# Patient Record
Sex: FEMALE | Race: BLACK OR AFRICAN AMERICAN | NOT HISPANIC OR LATINO | Employment: OTHER
[De-identification: names, ages, dates, MRNs, and addresses within clinical notes are randomized per-mention and may not be internally consistent; named-entity substitution may affect disease eponyms.]

---

## 2017-07-29 ENCOUNTER — HEALTH MAINTENANCE LETTER (OUTPATIENT)
Age: 22
End: 2017-07-29

## 2018-10-25 ENCOUNTER — TRANSFERRED RECORDS (OUTPATIENT)
Dept: MULTI SPECIALTY CLINIC | Facility: CLINIC | Age: 23
End: 2018-10-25

## 2018-10-25 LAB
C TRACH DNA SPEC QL PROBE+SIG AMP: NEGATIVE
N GONORRHOEA DNA SPEC QL PROBE+SIG AMP: NEGATIVE
PAP-ABSTRACT: NORMAL
SPECIMEN DESCRIP: NORMAL
SPECIMEN DESCRIPTION: NORMAL

## 2018-12-22 ENCOUNTER — HOSPITAL ENCOUNTER (EMERGENCY)
Facility: CLINIC | Age: 23
End: 2018-12-22

## 2018-12-22 ENCOUNTER — HOSPITAL ENCOUNTER (OUTPATIENT)
Facility: CLINIC | Age: 23
Discharge: HOME | End: 2018-12-22
Attending: SPECIALIST | Admitting: SPECIALIST
Payer: MEDICAID

## 2018-12-22 VITALS — SYSTOLIC BLOOD PRESSURE: 115 MMHG | TEMPERATURE: 98.8 F | DIASTOLIC BLOOD PRESSURE: 72 MMHG | RESPIRATION RATE: 16 BRPM

## 2018-12-22 PROBLEM — Z36.89 ENCOUNTER FOR TRIAGE IN PREGNANT PATIENT: Status: ACTIVE | Noted: 2018-12-22

## 2018-12-22 LAB
ALBUMIN UR-MCNC: 30 MG/DL
APPEARANCE UR: CLEAR
BILIRUB UR QL STRIP: NEGATIVE
COLOR UR AUTO: YELLOW
GLUCOSE UR STRIP-MCNC: 30 MG/DL
HGB UR QL STRIP: NEGATIVE
KETONES UR STRIP-MCNC: 5 MG/DL
LEUKOCYTE ESTERASE UR QL STRIP: NEGATIVE
MUCOUS THREADS #/AREA URNS LPF: PRESENT /LPF
NITRATE UR QL: NEGATIVE
PH UR STRIP: 6.5 PH (ref 5–7)
RBC #/AREA URNS AUTO: 0 /HPF (ref 0–2)
SOURCE: ABNORMAL
SP GR UR STRIP: 1.03 (ref 1–1.03)
SQUAMOUS #/AREA URNS AUTO: 1 /HPF (ref 0–1)
UROBILINOGEN UR STRIP-MCNC: 2 MG/DL (ref 0–2)
WBC #/AREA URNS AUTO: 0 /HPF (ref 0–5)

## 2018-12-22 PROCEDURE — 81001 URINALYSIS AUTO W/SCOPE: CPT | Performed by: SPECIALIST

## 2018-12-22 PROCEDURE — G0463 HOSPITAL OUTPT CLINIC VISIT: HCPCS

## 2018-12-22 PROCEDURE — 25000132 ZZH RX MED GY IP 250 OP 250 PS 637

## 2018-12-22 RX ORDER — ACETAMINOPHEN 325 MG/1
TABLET ORAL
Status: COMPLETED
Start: 2018-12-22 | End: 2018-12-22

## 2018-12-22 RX ORDER — ACETAMINOPHEN 325 MG/1
650 TABLET ORAL ONCE
Status: COMPLETED | OUTPATIENT
Start: 2018-12-22 | End: 2018-12-22

## 2018-12-22 RX ADMIN — ACETAMINOPHEN 650 MG: 325 TABLET ORAL at 02:54

## 2018-12-22 RX ADMIN — ACETAMINOPHEN 650 MG: 325 TABLET, FILM COATED ORAL at 02:54

## 2018-12-22 NOTE — PROVIDER NOTIFICATION
12/22/18 0240   Provider Notification   Provider Name/Title Dr. BAIRON Day   Method of Notification Phone   Request Evaluate - Remote   Notification Reason Patient Arrived;Pain;Lab/Diagnostic Study;Status Update   Dr. BAIRON Day advised of patient arrival, complaint of lower abdominal pain for past couple days, stating not painful now. FHR at 140. Urinalysis showed protein and glucose in urine, but no WBC's pr RBC's. Advised patient has not seen OB since U/S in [place] in November. Dr. Day feels pain probably due to round ligament discomfort. Okay to give Tylenol 2 tablets. Have patient make an appointment with an OB, give clinic numbers to a couple OB groups if patient desires. Discharge patient home.

## 2018-12-22 NOTE — PLAN OF CARE
0100- patient arrived ambulatory. She was sent from ER since she is just over 16 weeks.  Patient is here with complaints of abdominal cramping lower in abdomen on both sides. The cramping started Thursday around 11:00 a.m. Cramping comes and goes first one side and then the other. Pain subsided when laid down. Also complains of a whitsh discharge, no oder or vaginal itching, also denies any urinary symptoms. Also has a heavy feeling in upper abdomen but points to stomach area. Patient had gone to an urgent care in [place], [place] with nausea and found out she was pregnant, she saw an OB Doctor then had U/S on November 12, 2018 that placed her at 8 weeks then. That gave her a due date of June 6, 2019. She has not been seen since. She recently moved to [place] from [place] and has not found an OB/GYN MD here yet. Water given hydrate. So urine can be sent.

## 2018-12-22 NOTE — DISCHARGE INSTRUCTIONS
Discharge Instruction for Undelivered Patients      You were seen for: abdominal pain  We Consulted: Dr. Day  You had (Test or Medicine):doptone fetal heart rate, urinalysis     Diet:   Drink 8 to 12 glasses of liquids (milk, juice, water) every day.  You may eat meals and snacks.     Activity:       Call your provider if you notice:  Swelling in your face or increased swelling in your hands or legs.  Headaches that are not relieved by Tylenol (acetaminophen).  Changes in your vision (blurring: seeing spots or stars.)  Nausea (sick to your stomach) and vomiting (throwing up).   Weight gain of 5 pounds or more per week.  Heartburn that doesn't go away.  Signs of bladder infection: pain when you urinate (use the toilet), need to go more often and more urgently.  The bag of phan (rupture of membranes) breaks, or you notice leaking in your underwear.  Bright red blood in your underwear.  Abdominal (lower belly) or stomach pain.  For first baby: Contractions (tightening) less than 5 minutes apart for one hour or more.  Second (plus) baby: Contractions (tightening) less than 10 minutes apart and getting stronger.  *If less than 34 weeks: Contractions (tightenings) more than 6 times in one hour.  Increase or change in vaginal discharge (note the color and amount)  Other: make an appointment to be seen with an OB doctor.    Follow-up:  Find OB and make an appointment to be seen.

## 2018-12-22 NOTE — PLAN OF CARE
0200- Patient finally able to void, clean catch urine to lab. Patient denies  pain at this time. States was in [place] yesterday when abdominal discomfort first started. Was able to sleep most of Thursday  Night. Pain started again around 11:00 a.m. Friday. Was uncomfortable with it on the drive back form [place] this past evening. States the pain doesn't last very long. Unable to rate it. Denies any discomfort now. Just wanted to make sure everything was okay.

## 2018-12-22 NOTE — PLAN OF CARE
PO Tylenol given. Patient encourage to start Prenatal vitamins, find OB MD and make an appointment as soon as possible. Patient declined office numbers to any clinic groups at this time. Home ambulatory.

## 2019-01-07 LAB
ABO + RH BLD: NORMAL
ABO + RH BLD: NORMAL
HBV SURFACE AG SERPL QL IA: NONREACTIVE
HBV SURFACE AG SERPL QL IA: NORMAL
HCT VFR BLD AUTO: 37.4 %
HEMOGLOBIN: 13 G/DL (ref 11.7–15.7)
HIV 1+2 AB+HIV1 P24 AG SERPL QL IA: NONREACTIVE
HIV 1+2 AB+HIV1 P24 AG SERPL QL IA: NORMAL
PLATELET # BLD AUTO: 280 10^9/L
RUBELLA ABY IGG: NORMAL
RUBELLA ABY IGG: NORMAL
TREPONEMA ANTIBODIES: NONREACTIVE

## 2019-02-12 ENCOUNTER — TRANSFERRED RECORDS (OUTPATIENT)
Dept: HEALTH INFORMATION MANAGEMENT | Facility: CLINIC | Age: 24
End: 2019-02-12

## 2019-02-13 ENCOUNTER — TRANSFERRED RECORDS (OUTPATIENT)
Dept: HEALTH INFORMATION MANAGEMENT | Facility: CLINIC | Age: 24
End: 2019-02-13

## 2019-03-04 PROBLEM — Z23 NEED FOR TDAP VACCINATION: Status: ACTIVE | Noted: 2019-03-04

## 2019-03-11 ENCOUNTER — PRENATAL OFFICE VISIT (OUTPATIENT)
Dept: MIDWIFE SERVICES | Facility: CLINIC | Age: 24
End: 2019-03-11
Payer: COMMERCIAL

## 2019-03-11 VITALS — BODY MASS INDEX: 31.35 KG/M2 | SYSTOLIC BLOOD PRESSURE: 128 MMHG | WEIGHT: 170 LBS | DIASTOLIC BLOOD PRESSURE: 76 MMHG

## 2019-03-11 DIAGNOSIS — Z34.02 ENCOUNTER FOR SUPERVISION OF NORMAL FIRST PREGNANCY IN SECOND TRIMESTER: Primary | ICD-10-CM

## 2019-03-11 DIAGNOSIS — Z23 NEED FOR PROPHYLACTIC VACCINATION AND INOCULATION AGAINST INFLUENZA: ICD-10-CM

## 2019-03-11 DIAGNOSIS — Z23 NEED FOR TDAP VACCINATION: ICD-10-CM

## 2019-03-11 DIAGNOSIS — R21 RASH: ICD-10-CM

## 2019-03-11 LAB
GLUCOSE 1H P 50 G GLC PO SERPL-MCNC: 118 MG/DL (ref 60–129)
HGB BLD-MCNC: 13 G/DL (ref 11.7–15.7)

## 2019-03-11 PROCEDURE — 90471 IMMUNIZATION ADMIN: CPT | Performed by: ADVANCED PRACTICE MIDWIFE

## 2019-03-11 PROCEDURE — 99207 ZZC FIRST OB VISIT: CPT | Performed by: ADVANCED PRACTICE MIDWIFE

## 2019-03-11 PROCEDURE — 82950 GLUCOSE TEST: CPT | Performed by: ADVANCED PRACTICE MIDWIFE

## 2019-03-11 PROCEDURE — 90715 TDAP VACCINE 7 YRS/> IM: CPT | Performed by: ADVANCED PRACTICE MIDWIFE

## 2019-03-11 PROCEDURE — 90686 IIV4 VACC NO PRSV 0.5 ML IM: CPT | Performed by: ADVANCED PRACTICE MIDWIFE

## 2019-03-11 PROCEDURE — 36415 COLL VENOUS BLD VENIPUNCTURE: CPT | Performed by: ADVANCED PRACTICE MIDWIFE

## 2019-03-11 PROCEDURE — 00000218 ZZHCL STATISTIC OBHBG - HEMOGLOBIN: Performed by: ADVANCED PRACTICE MIDWIFE

## 2019-03-11 PROCEDURE — 90472 IMMUNIZATION ADMIN EACH ADD: CPT | Performed by: ADVANCED PRACTICE MIDWIFE

## 2019-03-11 RX ORDER — FOLIC ACID 1 MG/1
1 TABLET ORAL DAILY
COMMUNITY
End: 2023-06-14

## 2019-03-11 RX ORDER — PRENATAL VIT/IRON FUM/FOLIC AC 27MG-0.8MG
1 TABLET ORAL DAILY
Qty: 90 TABLET | Refills: 3 | Status: SHIPPED | OUTPATIENT
Start: 2019-03-11 | End: 2022-06-24

## 2019-03-11 NOTE — PROGRESS NOTES
27w4d   Diamante Thompson is a 23 year old who presents to the clinic for an new ob visit. She is not a previous CNM patient. Patient is transfer of care from [place]. Her  lives there and will plan to come for the delivery. She is from [place] and wanted to deliver where her mother is. Her sisters have been apart of our practice so she is familiar with us. She has no questions or concerns. Records in media tab. Patient reports all normal results except her ultrasound. Ultrasound not apart of her records, will request those. Per notes patient had two fetal anatomy scans, follow up due unable to see all anatomy well. Anatomy WNL. Also growth needed for marginal cord insertion. Ordered today. Doing well. No other questions or concerns today. Okay with tdap, flu, and will do GCT today.     Estimated Date of Delivery: Jun 6, 2019 is calculated from Patient's last menstrual period was 08/30/2018.     She has not had bleeding since her LMP.   She has had mild nausea. Weigh loss has not occurred.   This was a planned pregnancy.   [facility] is involved, Edgard Ortez    OTHER CONCERNS: no concerns     INFECTION HISTORY  HIV: no  Hepatitis B: no  Hepatitis C: no  Syphilis:  no  Tuberculosis: no   PPD- no  Herpes self: no  Herpes partner:  no  Chlamydia:  no  Gonorrhea:  no  HPV: no  BV:  no  Trichomonis:  no  Chicken Pox:  YES, vaccinated   ====================================================  GENETIC SCREENING  Genetic screening reviewed. High Risk? No   ====================================================  PERSONAL/SOCIAL HISTORY  Lives with parents.  Employment:unemployed.  Her job involves sedentary activity .  HX OF ABUSE: no  =====================================================   REVIEW OF SYSTEMS  CONSTITUTIONAL: NEGATIVE for fever, chills  EYES: NEGATIVE for vision changes   RESP: NEGATIVE for significant cough or SOB  CV: NEGATIVE for chest pain, palpitations   GI: NEGATIVE for nausea, abdominal pain, heartburn, or  change in bowel habits  : NEGATIVE for frequency, dysuria, or hematuria  MUSCULOSKELETAL: NEGATIVE for significant arthralgias or myalgia  NEURO: NEGATIVE for weakness, dizziness or paresthesias or headache  ====================================================    PHYSICAL EXAM:  LMP 2018   BMI- There is no height or weight on file to calculate BMI.,     RECOMMENDED WEIGHT GAIN: 15-25 lbs.  PHQ9- Today's Depression Rating was No Value exists for the : HP#PHQ9  GENERAL:  Pleasant pregnant female, alert, well groomed.   SKIN:  Warm and dry, without lesions or rashes  HEAD: Symmetrical features.  MOUTH:  Buccal mucosa pink, moist without lesions.    NECK:  Thyroid without enlargement and nodules.  Lymph nodes not palpable.   LUNGS:  Clear to auscultation.  HEART:  RRR without murmur.  ABDOMEN: Soft without masses , tenderness or organomegaly.  No CVA tenderness. No scars noted.     MUSCULOSKELETAL:  Full range of motion  EXTREMITIES:  No edema. No significant varicosities.   GENITALIA: deferred.    =========================================  ASSESSMENT:  27w4d  (Z34.02) Encounter for supervision of normal first pregnancy in second trimester  (primary encounter diagnosis)  Plan: Prenatal Vit-Fe Fumarate-FA (PRENATAL         MULTIVITAMIN W/IRON) 27-0.8 MG tablet, Glucose         tolerance, gest screen, 1 hour, OB hemoglobin    (Z23) Need for Tdap vaccination    PREGNANCY AT RISK? no  ==========================================  PLAN:  Instructed on use of triage nurse line and contacting the on call CNM after hours for an urgent need such as fever, vagina bleeding, bladder or vaginal infection, rupture of membranes,  or term labor.    Instructed on best evidence for: weight gain for her BMI for pregnancy; healthy diet and foods to avoid; exercise and activity during pregnancy;avoiding exposure to toxoplasmosis; and maintenance of a generally healthy lifestyle.   Discussed the harms, benefits, side  effects and alternative therapies for current prescribed and OTC medications.  Follow up in 4 weeks.     SARA HaleM

## 2019-03-11 NOTE — PROGRESS NOTES

## 2019-03-15 ENCOUNTER — HOSPITAL ENCOUNTER (OUTPATIENT)
Facility: CLINIC | Age: 24
Discharge: HOME | End: 2019-03-15
Attending: ADVANCED PRACTICE MIDWIFE | Admitting: ADVANCED PRACTICE MIDWIFE
Payer: COMMERCIAL

## 2019-03-15 VITALS
OXYGEN SATURATION: 100 % | DIASTOLIC BLOOD PRESSURE: 66 MMHG | TEMPERATURE: 99.4 F | WEIGHT: 174.9 LBS | HEIGHT: 62 IN | SYSTOLIC BLOOD PRESSURE: 109 MMHG | RESPIRATION RATE: 18 BRPM | BODY MASS INDEX: 32.18 KG/M2 | HEART RATE: 96 BPM

## 2019-03-15 PROCEDURE — 40000268 ZZH STATISTIC NO CHARGES

## 2019-03-15 PROCEDURE — G0463 HOSPITAL OUTPT CLINIC VISIT: HCPCS

## 2019-03-15 PROCEDURE — 99213 OFFICE O/P EST LOW 20 MIN: CPT | Performed by: ADVANCED PRACTICE MIDWIFE

## 2019-03-15 RX ORDER — ACETAMINOPHEN 500 MG
1000 TABLET ORAL ONCE
Status: ON HOLD | COMMUNITY
End: 2019-05-31

## 2019-03-15 RX ORDER — ONDANSETRON 2 MG/ML
4 INJECTION INTRAMUSCULAR; INTRAVENOUS EVERY 6 HOURS PRN
Status: DISCONTINUED | OUTPATIENT
Start: 2019-03-15 | End: 2019-03-16 | Stop reason: HOSPADM

## 2019-03-15 ASSESSMENT — MIFFLIN-ST. JEOR: SCORE: 1501.59

## 2019-03-16 NOTE — PLAN OF CARE
Data: [facility] at 2200.   Reason for maternal/fetal assessment per patient is Abdominal Pain (Constant abdominal pain started about 1 hour ago. Currently 28 weeks pregnant. Sees Midwives at [facility]. Last appointment was . )  . Patient is a . Prenatal record reviewed.      Obstetric History       T0      L0     SAB0   TAB0   Ectopic0   Multiple0   Live Births0       # Outcome Date GA Lbr Diego/2nd Weight Sex Delivery Anes PTL Lv   1 Current                  Medical History: History reviewed. No pertinent past medical history.. Gestational Age 28w1d. VSS. Cervix: not examined.  Fetal movement present. Patient denies cramping, backache, vaginal discharge, pelvic pressure, UTI symptoms, GI problems, bloody show, vaginal bleeding, edema, headache, visual disturbances, epigastric or URQ pain, rupture of membranes. Support person present.  Action: Verbal consent for EFM. Triage assessment completed. Uterine assessment showed no contractions. Fetal assessment: Presumed adequate fetal oxygenation documented (see flow record). Patient instructed to report change in fetal movement, vaginal leaking of fluid or bleeding, abdominal pain, or any concerns related to the pregnancy to her nurse/physician.   Response: Catherine Tate CNM informed of patients arrival and complaint of lower right quadrant constant pain that has been improved since patient took tylenol at home. Provider determined that pain was associated with round ligament. Plan per provider is to discharge home and recommended hot packs and tylenol to relieve discomfort. Patient verbalized understanding of education and verbalized agreement with plan. Discharged ambulatory at 2300.

## 2019-03-16 NOTE — H&P
HOSPITAL TRIAGE NOTE  ===================    CHIEF COMPLAINT  ========================  Diamante Thompson is a 23 year old patient presenting today at 28w1d for evaluation of abdominal pain.    Patient's last menstrual period was 2018.  Estimated Date of Delivery: 2019     HPI  ==================   Patient reports a constant RLQ pain. No contractions, LOF or bleeding. Baby is moving. Pt thinks it feels like round ligament pain but it has never persisted like this before. She took tylenol at home and it is now seeming to help.     CONTRACTIONS: none  ABDOMINAL PAIN: cramping  FETAL MOVEMENT: active    VAGINAL BLEEDING: none  RUPTURE OF MEMBRANES: no  PELVIC PAIN: none    REVIEW OF SYSTEMS  =====================  C: NEGATIVE for fever, chills  I: NEGATIVE for worrisome rashes, moles or lesions  E: NEGATIVE for vision changes or irritation  R: NEGATIVE for significant cough or SOB  CV: NEGATIVE for chest pain, palpitations or varicosities  GI: NEGATIVE for nausea, abdominal pain, heartburn, or change in bowel habits  : NEGATIVE for frequency, dysuria, or hematuria  M: NEGATIVE for significant arthralgias or myalgia  N: NEGATIVE for headache, weakness, dizziness or paresthesias  P: NEGATIVE for changes in mood or affect    HISTORIES  ==============  ALLERGIES:    No Known Allergies  PAST MEDICAL HISTORY  History reviewed. No pertinent past medical history.  PARTNER: here with friend/sister  FAMILY HISTORY  Family History   Problem Relation Age of Onset     Diabetes Mother         not on insulin     Diabetes Father         on insulin     Myocardial Infarction Father      Hypertension Father      OB HISTORY  Obstetric History       T0      L0     SAB0   TAB0   Ectopic0   Multiple0   Live Births0       # Outcome Date GA Lbr Diego/2nd Weight Sex Delivery Anes PTL Lv   1 Current                   EXAM  ============  /66   Pulse 96   Temp 99.4  F (37.4  C) (Oral)   Resp 18   Ht 1.575 m  "(5' 2\")   Wt 79.3 kg (174 lb 14.4 oz)   LMP 08/30/2018   SpO2 100%   BMI 31.99 kg/m    GENERAL APPEARANCE: healthy, alert and no distress  RESP: lungs clear to auscultation - no rales, rhonchi or wheezes  CV: regular rates and rhythm, normal S1 S2, no S3 or S4 and no murmur,and no varicosities  ABDOMEN:  soft, nontender,non-tender between contractions no epigastric pain  NEURO: Denies headache, blurred vision  PSYCH: mentation appears normal. and affect normal/bright  LEGS: No edema    CONTRACTIONS:  None  FETAL HEART TONES:  150 with moderate variability, accelerations-none, decels none.  NST: NOT DONE    PELVIC EXAM: deferred  PRESENTATION: unknown  BLOOD: no  DISCHARGE: none    LABS:  none    DIAGNOSIS  ============  28w1d  RLQ pain related to round ligament  FHT's in 150's with moderate variability    PLAN  ============  Home with PTL instructions per discharge instruction form and keep next scheduled appointment. Can come to clinic sooner for maternity support belt if she desires.   Catherine Cochran CNM      "

## 2019-03-16 NOTE — ED TRIAGE NOTES
Constant abdominal pain started about 1 hour ago. Currently 28 weeks pregnant. Sees Midwives at [facility]. Last appointment was March 11th.   Pt arrived to ED with complaint of constant abdominal pain for the last hour.  Pt reports 28 weeks pregnant.   Pt is having contractions No.   Pt feels urge to push No.   Pt reports water broke No.   Report was called and pt was transferred to L&D Yes.

## 2019-03-19 ENCOUNTER — NURSE TRIAGE (OUTPATIENT)
Dept: NURSING | Facility: CLINIC | Age: 24
End: 2019-03-19

## 2019-03-20 ENCOUNTER — OFFICE VISIT (OUTPATIENT)
Dept: FAMILY MEDICINE | Facility: CLINIC | Age: 24
End: 2019-03-20
Payer: COMMERCIAL

## 2019-03-20 VITALS
RESPIRATION RATE: 16 BRPM | OXYGEN SATURATION: 100 % | HEART RATE: 100 BPM | DIASTOLIC BLOOD PRESSURE: 62 MMHG | SYSTOLIC BLOOD PRESSURE: 106 MMHG | WEIGHT: 171 LBS | BODY MASS INDEX: 31.28 KG/M2 | TEMPERATURE: 98.9 F

## 2019-03-20 DIAGNOSIS — J00 ACUTE NASOPHARYNGITIS: Primary | ICD-10-CM

## 2019-03-20 DIAGNOSIS — J31.0 PREGNANCY RHINITIS: ICD-10-CM

## 2019-03-20 DIAGNOSIS — O99.519 PREGNANCY RHINITIS: ICD-10-CM

## 2019-03-20 PROCEDURE — 99214 OFFICE O/P EST MOD 30 MIN: CPT | Performed by: PHYSICIAN ASSISTANT

## 2019-03-20 RX ORDER — IPRATROPIUM BROMIDE 21 UG/1
2 SPRAY, METERED NASAL EVERY 12 HOURS
Qty: 30 ML | Refills: 0 | Status: ON HOLD | OUTPATIENT
Start: 2019-03-20 | End: 2019-05-31

## 2019-03-20 NOTE — PROGRESS NOTES
SUBJECTIVE:   Diamante Thompson is a 23 year old female who presents to clinic today for the following health issues:      RESPIRATORY SYMPTOMS      Duration: last night    Description  nasal congestion and ear pain both    Severity: moderate    Accompanying signs and symptoms: None    History (predisposing factors):  none    Precipitating or alleviating factors: None    Therapies tried and outcome:  none      HPI additional notes:    Chief Complaint   Patient presents with     Nasal Congestion     Ear Problem     Diamante presents today with upper respiratory sx. C/o ongoing rhinorrhea and congestion x1-1.5 wks. Developed bilat ear pressure last night. Denies f/c/s, ST, cough, HA, CP, SOB, n/v/d. Patient is currently pregnant. Using Afrin with relief but unable to use after 3 days.     ROS:    A Comprehensive greater than 10 system review of systems was carried out.    Pertinent positives and negatives are noted above.  Otherwise negative for contributory information.      Chart Review:  History   Smoking Status     Never Smoker   Smokeless Tobacco     Never Used     Comment: patient smoke hookah       Patient Active Problem List   Diagnosis     Encounter for triage in pregnant patient     Need for Tdap vaccination     Labor and delivery, indication for care     History reviewed. No pertinent surgical history.  Problem list, Medication list, Allergies, Medical/Social/Surg/Family hx reviewed in EPIC, updated as appropriate.   OBJECTIVE:                                                    /62   Pulse 100   Temp 98.9  F (37.2  C)   Resp 16   Wt 77.6 kg (171 lb)   LMP 08/30/2018   SpO2 100%   BMI 31.28 kg/m    Body mass index is 31.28 kg/m .  GENERAL:  WDWN, no acute distress  PSYCH: pleasant, cooperative  EYES: no discharge, no injection  HENT:  Normocephalic. Moist mucus membranes. TMs pearly gray, bulging w/ serous effusion. Nasal mucosa edematous, clear rhinorrhea. Oropharynx pink w/o tonsillar hypertrophy or  exudate, uvula midline, clear PND.  NECK:  Supple, symmetric, no PATRICE  LUNGS:  Clear to auscultation bilaterally without rhonchi, rales, or wheeze. Chest rise symmetric and no tenderness to palpation.  HEART:  Regular rate & rhythm. No murmur, gallop, or rub.  EXTREMITIES:  No gross deformities, moves all 4 limbs spontaneously  SKIN:  Warm and dry, no rash or suspicious lesions    NEUROLOGIC: alert, sensation grossly intact.    Diagnostic test results: none     ASSESSMENT/PLAN:                                                          ICD-10-CM    1. Acute nasopharyngitis J00 ipratropium (ATROVENT) 0.03 % nasal spray   2. Pregnancy rhinitis O99.519 ipratropium (ATROVENT) 0.03 % nasal spray    J31.0      Patient with persistent rhinitis, URI vs pregnancy rhinitis remain on differential. Limited treatment options due to pregnancy. Agree with discontinue Afrin due to risk of rebound congestion. Start ipratropium nasal spray BID prn. Also safe to use daily antihistamine in pregnancy, such as Claritin or Zyrtec. May use warm compress over ears to help with effusion. Discussed sx that warrant recheck.    Please see patient instructions for treatment details.  25 minutes total spent during this visit, >50% spent in counseling and coordination of patient care.    Follow up in 7-10 days if not improving as anticipated, sooner PRN.    Juana Roberson PA-C  [facility]

## 2019-03-20 NOTE — TELEPHONE ENCOUNTER
"Reason for Call/Nurse Assessment:  24 y/o female calls about congestion, she is 28w and 5d, asking about how to relieve nasal congestion, also having some ear pain. No difficulty breathing reported or heard during the conversation, she has not taken her temp, but does feel warm, she has been using nasal spray- \"Perrigo\" brand with small amount or steroid but it is not helping open sinuses, discussed stronger antihistamine sprays like Afrin but that she would need OB permission for that since she is pregnant.    RN Action/Disposiiton:  Briefly reviewed protocols, advised follow up with PCP to evaluate ear pain and ability to use stronger antihistamine for sinuses, call back if temp greater than 101.4, may use tylenol for comfort. RN advised to call back with any changes, worsening of symptoms, and questions or concerns.     Cris Guerrero RN - [facility] Nurse Advisor  03/19/2019   11:09PM    Additional Information    Negative: Severe difficulty breathing (e.g., struggling for each breath, speaks in single words)    Negative: Sounds like a life-threatening emergency to the triager    Cold with no complications (all triage questions negative)    Protocols used: COMMON COLD-ADULT-      "

## 2019-03-27 ENCOUNTER — DOCUMENTATION ONLY (OUTPATIENT)
Dept: CARE COORDINATION | Facility: CLINIC | Age: 24
End: 2019-03-27

## 2019-04-11 ENCOUNTER — OFFICE VISIT (OUTPATIENT)
Dept: DERMATOLOGY | Facility: CLINIC | Age: 24
End: 2019-04-11
Attending: ADVANCED PRACTICE MIDWIFE
Payer: COMMERCIAL

## 2019-04-11 DIAGNOSIS — L70.8 OTHER ACNE: ICD-10-CM

## 2019-04-11 DIAGNOSIS — L20.0 BESNIER'S PRURIGO: Primary | ICD-10-CM

## 2019-04-11 RX ORDER — SULFACETAMIDE SODIUM, SULFUR 100; 50 MG/G; MG/G
LOTION TOPICAL
Qty: 60 G | Refills: 1 | Status: SHIPPED | OUTPATIENT
Start: 2019-04-11 | End: 2022-06-24

## 2019-04-11 RX ORDER — BENZOYL PEROXIDE 10 G/100G
SUSPENSION TOPICAL
Qty: 187 G | Refills: 3 | Status: ON HOLD | OUTPATIENT
Start: 2019-04-11 | End: 2019-05-31

## 2019-04-11 RX ORDER — TRIAMCINOLONE ACETONIDE 1 MG/G
CREAM TOPICAL EVERY EVENING
Qty: 80 G | Refills: 1 | Status: ON HOLD | OUTPATIENT
Start: 2019-04-11 | End: 2019-05-31

## 2019-04-11 RX ORDER — EMOLLIENT BASE
CREAM (GRAM) TOPICAL 2 TIMES DAILY
Qty: 453 G | Refills: 5 | Status: SHIPPED | OUTPATIENT
Start: 2019-04-11 | End: 2023-06-14

## 2019-04-11 RX ORDER — CETIRIZINE HYDROCHLORIDE 10 MG/1
TABLET ORAL
Qty: 50 TABLET | Refills: 1 | Status: ON HOLD | OUTPATIENT
Start: 2019-04-11 | End: 2019-05-31

## 2019-04-11 ASSESSMENT — PAIN SCALES - GENERAL: PAINLEVEL: NO PAIN (0)

## 2019-04-11 NOTE — LETTER
4/11/2019       RE: Diamante Thompson  [address]   [address]  [facility] [address]     Dear Colleague,    Thank you for referring your patient, Diamante Thompson, to the [facility]ERMAT[facility] at [facility]. Please see a copy of my visit note below.    [facility] Dermatology Note      Dermatology Problem List:    Specialty Problems     None          CC:   Derm Problem (Diamante is here for rash on her body over a year very itchy)        Encounter Date: Apr 11, 2019    History of Present Illness:  Ms. Diamante Thompson is a 23 year old female who presents as a referral from [facility].  Patient is first time pregnant in 32nd week.   Since about 1 year all over the body itching with prurigo-like lesions on extremities, back, buttock. For some time itching improved, but since patient is pregnant more itching again.  Patient was checked for Thyroid and diabetes and other diseases because of pregnancy and no problems.     Past Medical History:   Patient Active Problem List   Diagnosis     Encounter for triage in pregnant patient     Need for Tdap vaccination     Labor and delivery, indication for care     No past medical history on file.    Allergy History:   No Known Allergies  Sometimes sneezing in Spring  Never had eczemas as child    Social History:  The patient does not work anymore. Patient has the following hobbies or non-occupational exposure     reports that she has never smoked. She has never used smokeless tobacco. She reports that she does not drink alcohol or use drugs.      Family History:  Family History   Problem Relation Age of Onset     Diabetes Mother         not on insulin     Diabetes Father         on insulin     Myocardial Infarction Father      Hypertension Father        Medications:  Current Outpatient Medications   Medication Sig Dispense Refill     acetaminophen (TYLENOL) 500 MG tablet Take 1,000 mg by mouth once       folic acid (FOLVITE) 1 MG  tablet Take 1 mg by mouth daily       Prenatal Vit-Fe Fumarate-FA (PRENATAL MULTIVITAMIN W/IRON) 27-0.8 MG tablet Take 1 tablet by mouth daily 90 tablet 3     ipratropium (ATROVENT) 0.03 % nasal spray Spray 2 sprays into both nostrils every 12 hours (Patient not taking: Reported on 4/11/2019) 30 mL 0       Review of Systems:  -As per HPI  -Constitutional: The patient denies fatigue, fevers, chills, unintended weight loss, and night sweats.  -HEENT: Patient denies nonhealing oral sores.  -Skin: As above in HPI. No additional skin concerns.    Physical exam:  Vitals: LMP 08/30/2018   GEN: This is a well developed, well-nourished female in no acute distress, in a pleasant mood. Pregnant    SKIN: Focused examination of the arms, legs and face was performed.  On arms and legs prurigo-like papular, hyperpigmented lesions, some of the erosive  On face comedones and papules/pustules of an acne (since 1 week acne)    -No other lesions of concern on areas examined.     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 About 50% of the time was spent counseling the patient and/or coordinating care regarding their allergy.        Again, thank you for allowing me to participate in the care of your patient.      Sincerely,    Roger Sidhu MD

## 2019-04-11 NOTE — PROGRESS NOTES
[facility] Dermatology Note      Dermatology Problem List:    Specialty Problems     None          CC:   Derm Problem (Diamante is here for rash on her body over a year very itchy)        Encounter Date: Apr 11, 2019    History of Present Illness:  Ms. Diamante Thompson is a 23 year old female who presents as a referral from [facility].  Patient is first time pregnant in 32nd week.   Since about 1 year all over the body itching with prurigo-like lesions on extremities, back, buttock. For some time itching improved, but since patient is pregnant more itching again.  Patient was checked for Thyroid and diabetes and other diseases because of pregnancy and no problems.     Past Medical History:   Patient Active Problem List   Diagnosis     Encounter for triage in pregnant patient     Need for Tdap vaccination     Labor and delivery, indication for care     No past medical history on file.    Allergy History:   No Known Allergies  Sometimes sneezing in Spring  Never had eczemas as child    Social History:  The patient does not work anymore. Patient has the following hobbies or non-occupational exposure     reports that she has never smoked. She has never used smokeless tobacco. She reports that she does not drink alcohol or use drugs.      Family History:  Family History   Problem Relation Age of Onset     Diabetes Mother         not on insulin     Diabetes Father         on insulin     Myocardial Infarction Father      Hypertension Father        Medications:  Current Outpatient Medications   Medication Sig Dispense Refill     acetaminophen (TYLENOL) 500 MG tablet Take 1,000 mg by mouth once       folic acid (FOLVITE) 1 MG tablet Take 1 mg by mouth daily       Prenatal Vit-Fe Fumarate-FA (PRENATAL MULTIVITAMIN W/IRON) 27-0.8 MG tablet Take 1 tablet by mouth daily 90 tablet 3     ipratropium (ATROVENT) 0.03 % nasal spray Spray 2 sprays into both nostrils every 12 hours (Patient not taking: Reported on 4/11/2019)  30 mL 0       Review of Systems:  -As per HPI  -Constitutional: The patient denies fatigue, fevers, chills, unintended weight loss, and night sweats.  -HEENT: Patient denies nonhealing oral sores.  -Skin: As above in HPI. No additional skin concerns.    Physical exam:  Vitals: LMP 08/30/2018   GEN: This is a well developed, well-nourished female in no acute distress, in a pleasant mood. Pregnant    SKIN: Focused examination of the arms, legs and face was performed.  On arms and legs prurigo-like papular, hyperpigmented lesions, some of the erosive  On face comedones and papules/pustules of an acne (since 1 week acne)    -No other lesions of concern on areas examined.     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 About 50% of the time was spent counseling the patient and/or coordinating care regarding their allergy.

## 2019-04-18 ENCOUNTER — ANCILLARY PROCEDURE (OUTPATIENT)
Dept: ULTRASOUND IMAGING | Facility: CLINIC | Age: 24
End: 2019-04-18
Attending: ADVANCED PRACTICE MIDWIFE
Payer: COMMERCIAL

## 2019-04-18 ENCOUNTER — PRENATAL OFFICE VISIT (OUTPATIENT)
Dept: MIDWIFE SERVICES | Facility: CLINIC | Age: 24
End: 2019-04-18
Attending: ADVANCED PRACTICE MIDWIFE
Payer: COMMERCIAL

## 2019-04-18 VITALS
HEIGHT: 62 IN | SYSTOLIC BLOOD PRESSURE: 114 MMHG | HEART RATE: 96 BPM | OXYGEN SATURATION: 98 % | WEIGHT: 180.6 LBS | DIASTOLIC BLOOD PRESSURE: 74 MMHG | BODY MASS INDEX: 33.23 KG/M2

## 2019-04-18 DIAGNOSIS — Z34.03 ENCOUNTER FOR SUPERVISION OF NORMAL FIRST PREGNANCY, THIRD TRIMESTER: Primary | ICD-10-CM

## 2019-04-18 DIAGNOSIS — Z34.02 ENCOUNTER FOR SUPERVISION OF NORMAL FIRST PREGNANCY IN SECOND TRIMESTER: ICD-10-CM

## 2019-04-18 PROCEDURE — 99207 ZZC PRENATAL VISIT: CPT | Performed by: ADVANCED PRACTICE MIDWIFE

## 2019-04-18 PROCEDURE — 76816 OB US FOLLOW-UP PER FETUS: CPT | Performed by: OBSTETRICS & GYNECOLOGY

## 2019-04-18 ASSESSMENT — PATIENT HEALTH QUESTIONNAIRE - PHQ9: SUM OF ALL RESPONSES TO PHQ QUESTIONS 1-9: 3

## 2019-04-18 ASSESSMENT — MIFFLIN-ST. JEOR: SCORE: 1527.45

## 2019-04-18 NOTE — PROGRESS NOTES
33w0d  Diamante is here alone today after follow-up growth. Waiting for final read from MD, but overall growth is 33.8%ile and SHAHEEN 11.4. She is feeling well. Baby is active, denies bleeding, leaking of fluid, contractions, headaches, RUQ pain, edema. Discussed danger signs and what to report. Knows to call [phone]. She had questions about the possibility of having a vertical incision if she were to need a C/S, or what happens when a vacuum is needed. Discussed her concerns, and reassured her that those things are unlikely, but that the care team would have a discussion with her if emergency issues were to arrive during her delivery. She verbalized understanding. Return to care 2 weeks.  Raffy Lopez CNM

## 2019-04-28 ENCOUNTER — NURSE TRIAGE (OUTPATIENT)
Dept: NURSING | Facility: CLINIC | Age: 24
End: 2019-04-28

## 2019-04-28 NOTE — TELEPHONE ENCOUNTER
Caller is pregnant and states she has been having sneezing and runny nose due to allergies. Caller wants to know if Benadryl is safe for her to take while pregnant. Triager advised of ok to take Benadryl for allergies. Triage guidelines recommend to home care. Caller verbalized and understands directives.    Additional Information    Negative: [1] Wheezing (high pitched whistling sound) AND [2] previous asthma attacks or use of asthma medicines    Negative: [1] Wheezing (high pitched whistling sound) AND [2] no history of asthma    Negative: Eye redness and itching are the only symptoms    Negative: Doesn't match the SYMPTOMS for Hay Fever    Negative: Patient sounds very sick or weak to the triager    Negative: Lots of coughing    Negative: [1] Lots of yellow or green discharge from nose AND [2] present > 3 days    Negative: [1] Taking antihistamines > 2 days AND [2] nasal allergy symptoms interfere with sleep, school, or work    Negative: [1] Nasal allergies AND [2] only certain times of year AND [3] hay fever diagnosis has never been confirmed by a HCP    Negative: [1] Nasal allergies AND [2] year-round symptoms    Negative: Snores most nights of month    [1] Nasal allergies AND [2] only certain times of year (hay fever) (all triage questions negative)    Protocols used: NASAL ALLERGIES (HAY FEVER)-ADULT-

## 2019-05-02 ENCOUNTER — PRENATAL OFFICE VISIT (OUTPATIENT)
Dept: MIDWIFE SERVICES | Facility: CLINIC | Age: 24
End: 2019-05-02
Payer: COMMERCIAL

## 2019-05-02 VITALS
HEART RATE: 98 BPM | DIASTOLIC BLOOD PRESSURE: 77 MMHG | WEIGHT: 186 LBS | SYSTOLIC BLOOD PRESSURE: 112 MMHG | BODY MASS INDEX: 34.02 KG/M2

## 2019-05-02 DIAGNOSIS — Z34.03 ENCOUNTER FOR SUPERVISION OF NORMAL FIRST PREGNANCY, THIRD TRIMESTER: Primary | ICD-10-CM

## 2019-05-02 PROCEDURE — 99207 ZZC PRENATAL VISIT: CPT | Performed by: ADVANCED PRACTICE MIDWIFE

## 2019-05-02 NOTE — PROGRESS NOTES
35w0d  Feeling well. Reports good fetal movement. Denies leaking of fluid, vaginal bleeding, regular uterine contractions, headache or other concerns.  RTC in 1 wk. J

## 2019-05-04 ENCOUNTER — NURSE TRIAGE (OUTPATIENT)
Dept: NURSING | Facility: CLINIC | Age: 24
End: 2019-05-04

## 2019-05-04 NOTE — TELEPHONE ENCOUNTER
Diamante is calling today to ask what medications she can take for a bad cold when she is pregnant.  Call to JEANNE Crain CNM on call to verify @ 3:20.     Farrah Welch RN/ [place] [facility]        Reason for Disposition    Health Information question, no triage required and triager able to answer question    Protocols used: INFORMATION ONLY CALL-A-

## 2019-05-07 ENCOUNTER — PRENATAL OFFICE VISIT (OUTPATIENT)
Dept: MIDWIFE SERVICES | Facility: CLINIC | Age: 24
End: 2019-05-07
Payer: COMMERCIAL

## 2019-05-07 VITALS
OXYGEN SATURATION: 98 % | BODY MASS INDEX: 33.84 KG/M2 | SYSTOLIC BLOOD PRESSURE: 106 MMHG | DIASTOLIC BLOOD PRESSURE: 70 MMHG | HEART RATE: 96 BPM | WEIGHT: 185 LBS

## 2019-05-07 DIAGNOSIS — Z34.03 ENCOUNTER FOR SUPERVISION OF NORMAL FIRST PREGNANCY, THIRD TRIMESTER: Primary | ICD-10-CM

## 2019-05-07 PROCEDURE — 99207 ZZC PRENATAL VISIT: CPT | Performed by: ADVANCED PRACTICE MIDWIFE

## 2019-05-07 NOTE — PROGRESS NOTES
Doing well.  No ctx.  Not working so rest prn.  Not fasting for Ramadan.  Discussed not fasting when preg rational.  ASSESSMENT: 35w5d   PLAN: RTC weekly until delivery.  GBS and Hgb next week.    ISAÍAS

## 2019-05-17 ENCOUNTER — PRENATAL OFFICE VISIT (OUTPATIENT)
Dept: MIDWIFE SERVICES | Facility: CLINIC | Age: 24
End: 2019-05-17
Payer: COMMERCIAL

## 2019-05-17 VITALS
WEIGHT: 191.2 LBS | HEIGHT: 62 IN | TEMPERATURE: 99 F | HEART RATE: 98 BPM | DIASTOLIC BLOOD PRESSURE: 74 MMHG | SYSTOLIC BLOOD PRESSURE: 116 MMHG | BODY MASS INDEX: 35.19 KG/M2 | OXYGEN SATURATION: 96 %

## 2019-05-17 DIAGNOSIS — Z34.03 ENCOUNTER FOR SUPERVISION OF NORMAL FIRST PREGNANCY, THIRD TRIMESTER: Primary | ICD-10-CM

## 2019-05-17 LAB — HGB BLD-MCNC: 13.9 G/DL (ref 11.7–15.7)

## 2019-05-17 PROCEDURE — 99207 ZZC PRENATAL VISIT: CPT | Performed by: ADVANCED PRACTICE MIDWIFE

## 2019-05-17 PROCEDURE — 00000218 ZZHCL STATISTIC OBHBG - HEMOGLOBIN: Performed by: ADVANCED PRACTICE MIDWIFE

## 2019-05-17 PROCEDURE — 36416 COLLJ CAPILLARY BLOOD SPEC: CPT | Performed by: ADVANCED PRACTICE MIDWIFE

## 2019-05-17 PROCEDURE — 87653 STREP B DNA AMP PROBE: CPT | Performed by: ADVANCED PRACTICE MIDWIFE

## 2019-05-17 ASSESSMENT — MIFFLIN-ST. JEOR: SCORE: 1575.53

## 2019-05-17 ASSESSMENT — PATIENT HEALTH QUESTIONNAIRE - PHQ9: SUM OF ALL RESPONSES TO PHQ QUESTIONS 1-9: 4

## 2019-05-17 NOTE — PROGRESS NOTES
Has been having a lot of sugar cravings and it is showing in her wt gain but does not appear that heavy as it seem proportional.  Active baby.  GBS collected by pt and Hgb today.  Will try to decrease sugar foods especially juices.    JE

## 2019-05-18 LAB
GP B STREP DNA SPEC QL NAA+PROBE: NEGATIVE
SPECIMEN SOURCE: NORMAL

## 2019-05-24 ENCOUNTER — PRENATAL OFFICE VISIT (OUTPATIENT)
Dept: MIDWIFE SERVICES | Facility: CLINIC | Age: 24
End: 2019-05-24
Payer: COMMERCIAL

## 2019-05-24 VITALS
BODY MASS INDEX: 35.5 KG/M2 | WEIGHT: 194.1 LBS | HEART RATE: 95 BPM | SYSTOLIC BLOOD PRESSURE: 111 MMHG | DIASTOLIC BLOOD PRESSURE: 77 MMHG

## 2019-05-24 DIAGNOSIS — Z34.03 ENCOUNTER FOR SUPERVISION OF NORMAL FIRST PREGNANCY, THIRD TRIMESTER: Primary | ICD-10-CM

## 2019-05-24 PROCEDURE — 99207 ZZC PRENATAL VISIT: CPT | Performed by: ADVANCED PRACTICE MIDWIFE

## 2019-05-24 NOTE — PROGRESS NOTES
Feeling well.  Baby is active. Denies any leaking of fluid, vaginal bleeding, regular uterine contractions, or headaches or other concerns.  She is still drinking a lot of juice and eating sugar.  We discussed cutting down.    Reviewed to call [phone] for contractions, loss of fluid, vaginal bleeding, decreased fetal movement or any other questions or concerns.    RTC in 1 weeks.  Sophie Potter, ROGELIO, APRN, CNM

## 2019-05-28 ENCOUNTER — NURSE TRIAGE (OUTPATIENT)
Dept: NURSING | Facility: CLINIC | Age: 24
End: 2019-05-28

## 2019-05-28 NOTE — TELEPHONE ENCOUNTER
"24 y/o female who is 38w5d calls about lower abdominal cramping and pressure, denies rupture of membranes, no vaginal bleeding, has not lost her mucous plug, denies pain in abdomen is getting worse, they are at least 20 minutes apart right now, but she was unable to answer questions about the baby's movement, this is her fist baby and she was not able to give a kick count.     *Also, patient tells nurse that she has been fasting (no eating and no drinking anything) from sunup at 4AM to sun down at 9PM for Ramadan.    RN reviewed protocols, at this time Home Care is advised, dehydration can cause muscle cramping, can contribute to jaime davis (false labor), advised her to put her feet up, drink several glasses of water to night before going to bed, confirms she has urinated at least 2 times today, also provided education on kick count, she will go and lie on her left side in quiet place and she will count any kick, flutter or movement - need 5 in 1 hour or over the course of 2 hours. RN advised to call back with any changes, worsening of symptoms, and questions or concerns.     Cris Guerrero RN - [place] [facility]  5/28/2019   12:25AM    Additional Information    Negative: Passed out (i.e., lost consciousness, collapsed and was not responding)    Negative: Shock suspected (e.g., cold/pale/clammy skin, too weak to stand, low BP, rapid pulse)    Negative: Difficult to awaken or acting confused (e.g., disoriented, slurred speech)    Negative: [1] SEVERE abdominal pain (e.g., excruciating) AND [2] constant AND [3] present > 1 hour    Negative: Severe bleeding (e.g., continuous red blood from vagina, or large blood clots)    Negative: Umbilical cord hanging out of the vagina (shiny, white, curled appearance, \"like telephone cord\")    Negative: Uncontrollable urge to push (i.e., feels like baby is coming out now)    Negative: Can see baby    Negative: Sounds like a life-threatening emergency to the triager    " Negative: Baby moving less today (e.g., kick count < 5 in 1 hour or < 10 in 2 hours)    Negative: Vaginal bleeding or spotting    Negative: [1] First baby (primipara) AND [2] contractions < 6 minutes apart  AND [3] present 2 hours    Negative: [1] History of prior delivery (multipara) AND [2] contractions < 10 minutes apart AND [3] present 1 hour    Negative: [1] History of rapid prior delivery AND [2] contractions < 10 minutes apart    Negative: [1] Leakage of fluid from vagina AND [2] green or brown in color    Negative: [1] Leakage of fluid from vagina AND [2] leakage started > 4 hours ago    Negative: Severe headache or headache that won't go away    Negative: New blurred vision or vision changes    Negative: MODERATE-SEVERE abdominal pain    Negative: Fever > 100.4 F (38.0 C)    Negative: [1] Leakage of fluid from vagina AND [2] leakage started < 4 hours ago    Negative: Patient sounds very sick or weak to the triager    [1] First baby (primipara) AND [2] contractions > 5 minutes apart, or for < 2 hours    Protocols used: PREGNANCY - LABOR-A-

## 2019-05-28 NOTE — TELEPHONE ENCOUNTER
Patient calls back to tell nurse that she ws able to complete a kick count and yes, there were at least and more than 5 kicks per hour, she also increased her fluids but still feels contractions in lower abdomen now with some low back ache, contractions are 10-15 minutes apart.  She thinks she might have lost her mucous plug since we last talked, thick mucous when she wipes.     She is planning on delivering at [place], she is followed by  Midwife    Patient is at same disposition, continue to monitor contractions, when <6 minutes apart for 2 hours consistently, call back and we will page the Midwife.    Cris Guerrero RN - [place] [facility]  5/28/2019    2:55AM

## 2019-05-29 ENCOUNTER — ANESTHESIA EVENT (OUTPATIENT)
Dept: OBGYN | Facility: CLINIC | Age: 24
End: 2019-05-29
Payer: COMMERCIAL

## 2019-05-29 ENCOUNTER — PRENATAL OFFICE VISIT (OUTPATIENT)
Dept: MIDWIFE SERVICES | Facility: CLINIC | Age: 24
End: 2019-05-29
Payer: COMMERCIAL

## 2019-05-29 ENCOUNTER — ANESTHESIA (OUTPATIENT)
Dept: OBGYN | Facility: CLINIC | Age: 24
End: 2019-05-29
Payer: COMMERCIAL

## 2019-05-29 ENCOUNTER — HOSPITAL ENCOUNTER (INPATIENT)
Facility: CLINIC | Age: 24
LOS: 2 days | Discharge: HOME HEALTH | End: 2019-05-31
Attending: ADVANCED PRACTICE MIDWIFE | Admitting: ADVANCED PRACTICE MIDWIFE
Payer: COMMERCIAL

## 2019-05-29 VITALS
OXYGEN SATURATION: 97 % | BODY MASS INDEX: 35.81 KG/M2 | HEIGHT: 62 IN | DIASTOLIC BLOOD PRESSURE: 73 MMHG | WEIGHT: 194.6 LBS | SYSTOLIC BLOOD PRESSURE: 106 MMHG | HEART RATE: 87 BPM

## 2019-05-29 DIAGNOSIS — Z34.03 ENCOUNTER FOR SUPERVISION OF NORMAL FIRST PREGNANCY, THIRD TRIMESTER: Primary | ICD-10-CM

## 2019-05-29 LAB
ABO + RH BLD: NORMAL
ABO + RH BLD: NORMAL
AMPHETAMINES UR QL SCN: NEGATIVE
BLD GP AB SCN SERPL QL: NORMAL
BLOOD BANK CMNT PATIENT-IMP: NORMAL
CANNABINOIDS UR QL: NEGATIVE
COCAINE UR QL: NEGATIVE
ERYTHROCYTE [DISTWIDTH] IN BLOOD BY AUTOMATED COUNT: 12.6 % (ref 10–15)
HCT VFR BLD AUTO: 39.2 % (ref 35–47)
HGB BLD-MCNC: 13.3 G/DL (ref 11.7–15.7)
MCH RBC QN AUTO: 28.7 PG (ref 26.5–33)
MCHC RBC AUTO-ENTMCNC: 33.9 G/DL (ref 31.5–36.5)
MCV RBC AUTO: 85 FL (ref 78–100)
OPIATES UR QL SCN: NEGATIVE
PCP UR QL SCN: NEGATIVE
PLATELET # BLD AUTO: 247 10E9/L (ref 150–450)
RBC # BLD AUTO: 4.63 10E12/L (ref 3.8–5.2)
SPECIMEN EXP DATE BLD: NORMAL
WBC # BLD AUTO: 14.8 10E9/L (ref 4–11)

## 2019-05-29 PROCEDURE — 25000128 H RX IP 250 OP 636: Performed by: ANESTHESIOLOGY

## 2019-05-29 PROCEDURE — 25800030 ZZH RX IP 258 OP 636

## 2019-05-29 PROCEDURE — 80307 DRUG TEST PRSMV CHEM ANLYZR: CPT | Performed by: ADVANCED PRACTICE MIDWIFE

## 2019-05-29 PROCEDURE — 85027 COMPLETE CBC AUTOMATED: CPT | Performed by: ADVANCED PRACTICE MIDWIFE

## 2019-05-29 PROCEDURE — 25800030 ZZH RX IP 258 OP 636: Performed by: ADVANCED PRACTICE MIDWIFE

## 2019-05-29 PROCEDURE — 86900 BLOOD TYPING SEROLOGIC ABO: CPT | Performed by: ADVANCED PRACTICE MIDWIFE

## 2019-05-29 PROCEDURE — 59426 ANTEPARTUM CARE ONLY: CPT | Performed by: ADVANCED PRACTICE MIDWIFE

## 2019-05-29 PROCEDURE — 25000125 ZZHC RX 250: Performed by: ADVANCED PRACTICE MIDWIFE

## 2019-05-29 PROCEDURE — 00HU33Z INSERTION OF INFUSION DEVICE INTO SPINAL CANAL, PERCUTANEOUS APPROACH: ICD-10-PCS | Performed by: ANESTHESIOLOGY

## 2019-05-29 PROCEDURE — 25000125 ZZHC RX 250: Performed by: STUDENT IN AN ORGANIZED HEALTH CARE EDUCATION/TRAINING PROGRAM

## 2019-05-29 PROCEDURE — 86780 TREPONEMA PALLIDUM: CPT | Performed by: ADVANCED PRACTICE MIDWIFE

## 2019-05-29 PROCEDURE — 12000001 ZZH R&B MED SURG/OB UMMC

## 2019-05-29 PROCEDURE — 25000128 H RX IP 250 OP 636: Performed by: ADVANCED PRACTICE MIDWIFE

## 2019-05-29 PROCEDURE — 99207 ZZC PRENATAL VISIT: CPT | Performed by: ADVANCED PRACTICE MIDWIFE

## 2019-05-29 PROCEDURE — 86850 RBC ANTIBODY SCREEN: CPT | Performed by: ADVANCED PRACTICE MIDWIFE

## 2019-05-29 PROCEDURE — 86901 BLOOD TYPING SEROLOGIC RH(D): CPT | Performed by: ADVANCED PRACTICE MIDWIFE

## 2019-05-29 PROCEDURE — G0463 HOSPITAL OUTPT CLINIC VISIT: HCPCS

## 2019-05-29 PROCEDURE — 3E0R3BZ INTRODUCTION OF ANESTHETIC AGENT INTO SPINAL CANAL, PERCUTANEOUS APPROACH: ICD-10-PCS | Performed by: ANESTHESIOLOGY

## 2019-05-29 RX ORDER — LIDOCAINE HYDROCHLORIDE 10 MG/ML
INJECTION, SOLUTION EPIDURAL; INFILTRATION; INTRACAUDAL; PERINEURAL
Status: DISCONTINUED
Start: 2019-05-29 | End: 2019-05-30 | Stop reason: HOSPADM

## 2019-05-29 RX ORDER — OXYTOCIN/0.9 % SODIUM CHLORIDE 30/500 ML
PLASTIC BAG, INJECTION (ML) INTRAVENOUS
Status: DISCONTINUED
Start: 2019-05-29 | End: 2019-05-30 | Stop reason: HOSPADM

## 2019-05-29 RX ORDER — OXYTOCIN 10 [USP'U]/ML
INJECTION, SOLUTION INTRAMUSCULAR; INTRAVENOUS
Status: DISCONTINUED
Start: 2019-05-29 | End: 2019-05-30 | Stop reason: HOSPADM

## 2019-05-29 RX ORDER — EPHEDRINE SULFATE 50 MG/ML
5 INJECTION, SOLUTION INTRAMUSCULAR; INTRAVENOUS; SUBCUTANEOUS
Status: DISCONTINUED | OUTPATIENT
Start: 2019-05-29 | End: 2019-05-30

## 2019-05-29 RX ORDER — OXYCODONE AND ACETAMINOPHEN 5; 325 MG/1; MG/1
1 TABLET ORAL
Status: DISCONTINUED | OUTPATIENT
Start: 2019-05-29 | End: 2019-05-30

## 2019-05-29 RX ORDER — LIDOCAINE HYDROCHLORIDE AND EPINEPHRINE 15; 5 MG/ML; UG/ML
INJECTION, SOLUTION EPIDURAL PRN
Status: DISCONTINUED | OUTPATIENT
Start: 2019-05-29 | End: 2019-05-31 | Stop reason: HOSPADM

## 2019-05-29 RX ORDER — CARBOPROST TROMETHAMINE 250 UG/ML
250 INJECTION, SOLUTION INTRAMUSCULAR
Status: DISCONTINUED | OUTPATIENT
Start: 2019-05-29 | End: 2019-05-30

## 2019-05-29 RX ORDER — OXYTOCIN/0.9 % SODIUM CHLORIDE 30/500 ML
1-24 PLASTIC BAG, INJECTION (ML) INTRAVENOUS CONTINUOUS
Status: DISCONTINUED | OUTPATIENT
Start: 2019-05-29 | End: 2019-05-30

## 2019-05-29 RX ORDER — NALOXONE HYDROCHLORIDE 0.4 MG/ML
.1-.4 INJECTION, SOLUTION INTRAMUSCULAR; INTRAVENOUS; SUBCUTANEOUS
Status: DISCONTINUED | OUTPATIENT
Start: 2019-05-29 | End: 2019-05-30

## 2019-05-29 RX ORDER — IBUPROFEN 800 MG/1
800 TABLET, FILM COATED ORAL
Status: DISCONTINUED | OUTPATIENT
Start: 2019-05-29 | End: 2019-05-30

## 2019-05-29 RX ORDER — MISOPROSTOL 200 UG/1
TABLET ORAL
Status: DISCONTINUED
Start: 2019-05-29 | End: 2019-05-30 | Stop reason: HOSPADM

## 2019-05-29 RX ORDER — NALBUPHINE HYDROCHLORIDE 10 MG/ML
2.5-5 INJECTION, SOLUTION INTRAMUSCULAR; INTRAVENOUS; SUBCUTANEOUS EVERY 6 HOURS PRN
Status: DISCONTINUED | OUTPATIENT
Start: 2019-05-29 | End: 2019-05-30

## 2019-05-29 RX ORDER — ACETAMINOPHEN 325 MG/1
650 TABLET ORAL EVERY 4 HOURS PRN
Status: DISCONTINUED | OUTPATIENT
Start: 2019-05-29 | End: 2019-05-30

## 2019-05-29 RX ORDER — OXYTOCIN 10 [USP'U]/ML
10 INJECTION, SOLUTION INTRAMUSCULAR; INTRAVENOUS
Status: DISCONTINUED | OUTPATIENT
Start: 2019-05-29 | End: 2019-05-30

## 2019-05-29 RX ORDER — ONDANSETRON 2 MG/ML
4 INJECTION INTRAMUSCULAR; INTRAVENOUS EVERY 6 HOURS PRN
Status: DISCONTINUED | OUTPATIENT
Start: 2019-05-29 | End: 2019-05-30

## 2019-05-29 RX ORDER — FENTANYL CITRATE 50 UG/ML
50-100 INJECTION, SOLUTION INTRAMUSCULAR; INTRAVENOUS
Status: DISCONTINUED | OUTPATIENT
Start: 2019-05-29 | End: 2019-05-30

## 2019-05-29 RX ORDER — METHYLERGONOVINE MALEATE 0.2 MG/ML
200 INJECTION INTRAVENOUS
Status: DISCONTINUED | OUTPATIENT
Start: 2019-05-29 | End: 2019-05-30

## 2019-05-29 RX ORDER — LIDOCAINE 40 MG/G
CREAM TOPICAL
Status: DISCONTINUED | OUTPATIENT
Start: 2019-05-29 | End: 2019-05-30

## 2019-05-29 RX ORDER — SODIUM CHLORIDE, SODIUM LACTATE, POTASSIUM CHLORIDE, CALCIUM CHLORIDE 600; 310; 30; 20 MG/100ML; MG/100ML; MG/100ML; MG/100ML
INJECTION, SOLUTION INTRAVENOUS
Status: COMPLETED
Start: 2019-05-29 | End: 2019-05-29

## 2019-05-29 RX ORDER — SODIUM CHLORIDE, SODIUM LACTATE, POTASSIUM CHLORIDE, CALCIUM CHLORIDE 600; 310; 30; 20 MG/100ML; MG/100ML; MG/100ML; MG/100ML
INJECTION, SOLUTION INTRAVENOUS CONTINUOUS
Status: DISCONTINUED | OUTPATIENT
Start: 2019-05-29 | End: 2019-05-30

## 2019-05-29 RX ORDER — OXYTOCIN/0.9 % SODIUM CHLORIDE 30/500 ML
100-340 PLASTIC BAG, INJECTION (ML) INTRAVENOUS CONTINUOUS PRN
Status: DISCONTINUED | OUTPATIENT
Start: 2019-05-29 | End: 2019-05-30

## 2019-05-29 RX ADMIN — OXYTOCIN-SODIUM CHLORIDE 0.9% IV SOLN 30 UNIT/500ML 2 MILLI-UNITS/MIN: 30-0.9/5 SOLUTION at 23:40

## 2019-05-29 RX ADMIN — SODIUM CHLORIDE, POTASSIUM CHLORIDE, SODIUM LACTATE AND CALCIUM CHLORIDE 1000 ML: 600; 310; 30; 20 INJECTION, SOLUTION INTRAVENOUS at 19:29

## 2019-05-29 RX ADMIN — FENTANYL CITRATE 50 MCG: 50 INJECTION INTRAMUSCULAR; INTRAVENOUS at 18:15

## 2019-05-29 RX ADMIN — LIDOCAINE HYDROCHLORIDE,EPINEPHRINE BITARTRATE 3 ML: 15; .005 INJECTION, SOLUTION EPIDURAL; INFILTRATION; INTRACAUDAL; PERINEURAL at 20:45

## 2019-05-29 RX ADMIN — SODIUM CHLORIDE, POTASSIUM CHLORIDE, SODIUM LACTATE AND CALCIUM CHLORIDE: 600; 310; 30; 20 INJECTION, SOLUTION INTRAVENOUS at 20:35

## 2019-05-29 RX ADMIN — Medication 10 ML/HR: at 20:48

## 2019-05-29 RX ADMIN — OXYTOCIN-SODIUM CHLORIDE 0.9% IV SOLN 30 UNIT/500ML 2 MILLI-UNITS/MIN: 30-0.9/5 SOLUTION at 21:21

## 2019-05-29 RX ADMIN — Medication 10 ML/HR: at 20:12

## 2019-05-29 ASSESSMENT — MIFFLIN-ST. JEOR: SCORE: 1590.95

## 2019-05-29 NOTE — PLAN OF CARE
Pt arrived from clinic for rule out labor from clinic where SVE 2/100/+1.   EFM and toco applied.  Triage assessment completed.   HAMLET Cochran notified of pt status. Plan to admit for labor.

## 2019-05-29 NOTE — H&P
ADMIT NOTE  =================  38w6d  Diamante Thompson is a 23 year old female     with an Patient's last menstrual period was 2018. and Estimated Date of Delivery: 2019 [facility] at 2:26 PM in early labor.   Fetal movement- active  ROM- no   GBS- negative    HPI  ================  Pt reports contractions started on Monday and that she lost her mucus plug that day. Contractions have become closer together and stronger over yesterday and this morning. She went to her scheduled clinic visit and was visibly uncomfortable with contractions, breathing through them. Cervix 290/+1 with a bulging bag of water.   FOB- not present  Other labor support- sister    PRENATAL COURSE  =================  Prenatal course was   complicated by    Patient Active Problem List    Diagnosis Date Noted     Labor and delivery, indication for care 2019     Priority: Medium     Need for Tdap vaccination 2019     Priority: Medium     Encounter for triage in pregnant patient 2018     Priority: Medium        HISTORIES  ============  No Known Allergies  History reviewed. No pertinent past medical history.  History reviewed. No pertinent surgical history..  Family History   Problem Relation Age of Onset     Diabetes Mother         not on insulin     Diabetes Father         on insulin     Myocardial Infarction Father      Hypertension Father      Social History     Tobacco Use     Smoking status: Never Smoker     Smokeless tobacco: Never Used     Tobacco comment: patient smoke hookah   Substance Use Topics     Alcohol use: No     Alcohol/week: 0.0 oz     OB History    Para Term  AB Living   1 0 0 0 0 0   SAB TAB Ectopic Multiple Live Births   0 0 0 0 0      # Outcome Date GA Lbr Diego/2nd Weight Sex Delivery Anes PTL Lv   1 Current                 LABS:   ===========  Rhogam not indicated  Lab Results   Component Value Date    ABO b 2019       Lab Results    Component Value Date    RH Pos 01/07/2019     Lab Results   Component Value Date    RUBELLAABIGG normal 01/07/2019    RUBELLAABIGG immune 01/07/2019      Anti-trep - NR  HIV - NR  Lab Results   Component Value Date    HGB 13.9 05/17/2019      Lab Results   Component Value Date    HEPBANG normal 01/07/2019    HEPBANG nonreactive 01/07/2019     Lab Results   Component Value Date    GBS Negative 05/17/2019     other labs- GCT - 118    ROS  =========  Pt denies significant respiratory, cardiovacular, GI, or muscular/skeletalcomplaints.    See RN data base ROS.     PHYSICAL EXAM:  ===============  Blood pressure 130/77, temperature 98.4  F (36.9  C), temperature source Oral, resp. rate 18, last menstrual period 08/30/2018, not currently breastfeeding.  General appearance: uncomfortable with contractions  Heart: RRR without murmur  Lungs: clear to auscultation   Neuro: denies headache and visual disturbances  Psych: Mentation normal and bright   Legs: 2+/2+, no clonus, no edema      Abdomen: gravid, single vertex fetus, non-tender between contractions.  EFW-  7 lbs.   CONTRACTIONS: Contractions every 2.5-5 minutes.  Palpate: mild  FETAL HEART TONES: baseline 130 with moderate FHR variability and  pos accelerations. No decelerations present.      PELVIC EXAM: 2/90/+1  BEACH SCORE: 10  BLOODY SHOW: no   ROM: No  FLUID: increased discharge since losing mucus plug on Monday  AMNISURE: not done    ASSESSMENT:  ==============  IUP @ 38w6d in early labor   NST REACTIVE  Fetal Heart rate tracing Category one  GBS- negative     PLAN:  ===========  Admit - see IP orders  Pain medication per patient request - interested in nitrous oxide for pain management at this time   IV access and labs - ok to saline lock IV for now  Reevaluate in 2-4 hours or sooner SARA Ascencio CNM

## 2019-05-29 NOTE — PROGRESS NOTES
38w6d  Diamante is here by herself today. She lost her mucous plug on Monday, and has noticed an increase in vaginal discharge since then. She has been having regular contractions, Mon night every 15-20min, tues every 8-10min, now every 6-8. She is breathing and rocking through contractions during this visit, unable to talk through them. SVE 2/100/+1 with BBOW, very uncomfortable with vaginal exam. Cephalic by BSUS. She is interested in going to L&D, as she is not sure how much longer she can take the pain associated with this early labor. Baby is active; denies bleeding, headache. Plans to go to L&D now, Catherine garrison CNM, is aware.   Raffy Lopez CNM

## 2019-05-29 NOTE — PROGRESS NOTES
S:Pt reports contractions are spacing out and she is more comfortable than when she was in clinic earlier today. Agrees to cervical exam.     O:  Blood pressure 125/75, temperature 98.7  F (37.1  C), temperature source Oral, resp. rate 18, last menstrual period 08/30/2018, not currently breastfeeding.  General appearance: comfortable    CONTRACTIONS: Contractions every 8-10 minutes.  Palpate: mild  FETAL HEART TONES: baseline 130 with moderate FHR variability and    accelerations yes. Decelerations no.    NST: REACTIVE  ROM: moderate meconium fluid  PELVIC EXAM:4/90/+1  Fetal Position:  vertex  Bloody show: No  Pitocin- none,  Antibiotics- none    ASSESSMENT:  ==============  IUP @ 38w6d active labor   Fetal Heart rate tracing Category one  GBS- negative  Meconium stained fluid     PLAN:  ===========  comfort measures prn - patient would like birthing ball to sit on  Pain medication per patient request - using nitrous and aware of other options  MD consultant on call Dr Pryor/ available prn - notified of pt status and is going to cover for CNM until 2000  Reevaluate in 2-4 hours/PRN   Catherine Cochran CNM

## 2019-05-30 ENCOUNTER — HOSPITAL ENCOUNTER (OUTPATIENT)
Facility: CLINIC | Age: 24
End: 2019-05-30
Payer: COMMERCIAL

## 2019-05-30 PROBLEM — Z16.12 ESBL (EXTENDED SPECTRUM BETA-LACTAMASE) PRODUCING BACTERIA INFECTION: Status: ACTIVE | Noted: 2019-05-30

## 2019-05-30 PROBLEM — A49.9 ESBL (EXTENDED SPECTRUM BETA-LACTAMASE) PRODUCING BACTERIA INFECTION: Status: ACTIVE | Noted: 2019-05-30

## 2019-05-30 LAB
ALBUMIN UR-MCNC: 30 MG/DL
APPEARANCE UR: ABNORMAL
BILIRUB UR QL STRIP: NEGATIVE
COLOR UR AUTO: YELLOW
GLUCOSE UR STRIP-MCNC: NEGATIVE MG/DL
HGB BLD-MCNC: 11.5 G/DL (ref 11.7–15.7)
HGB UR QL STRIP: ABNORMAL
KETONES UR STRIP-MCNC: NEGATIVE MG/DL
LEUKOCYTE ESTERASE UR QL STRIP: ABNORMAL
MUCOUS THREADS #/AREA URNS LPF: PRESENT /LPF
NITRATE UR QL: NEGATIVE
PH UR STRIP: 6 PH (ref 5–7)
RBC #/AREA URNS AUTO: >182 /HPF (ref 0–2)
SOURCE: ABNORMAL
SP GR UR STRIP: 1.02 (ref 1–1.03)
SQUAMOUS #/AREA URNS AUTO: 2 /HPF (ref 0–1)
T PALLIDUM AB SER QL: NONREACTIVE
UROBILINOGEN UR STRIP-MCNC: NORMAL MG/DL (ref 0–2)
WBC #/AREA URNS AUTO: 73 /HPF (ref 0–5)

## 2019-05-30 PROCEDURE — 87086 URINE CULTURE/COLONY COUNT: CPT | Performed by: ADVANCED PRACTICE MIDWIFE

## 2019-05-30 PROCEDURE — 12000001 ZZH R&B MED SURG/OB UMMC

## 2019-05-30 PROCEDURE — 25000125 ZZHC RX 250: Performed by: ADVANCED PRACTICE MIDWIFE

## 2019-05-30 PROCEDURE — 36415 COLL VENOUS BLD VENIPUNCTURE: CPT | Performed by: ADVANCED PRACTICE MIDWIFE

## 2019-05-30 PROCEDURE — 25000132 ZZH RX MED GY IP 250 OP 250 PS 637: Performed by: ADVANCED PRACTICE MIDWIFE

## 2019-05-30 PROCEDURE — 59410 OBSTETRICAL CARE: CPT | Performed by: ADVANCED PRACTICE MIDWIFE

## 2019-05-30 PROCEDURE — 10907ZC DRAINAGE OF AMNIOTIC FLUID, THERAPEUTIC FROM PRODUCTS OF CONCEPTION, VIA NATURAL OR ARTIFICIAL OPENING: ICD-10-PCS | Performed by: ADVANCED PRACTICE MIDWIFE

## 2019-05-30 PROCEDURE — 81001 URINALYSIS AUTO W/SCOPE: CPT | Performed by: ADVANCED PRACTICE MIDWIFE

## 2019-05-30 PROCEDURE — 0UQMXZZ REPAIR VULVA, EXTERNAL APPROACH: ICD-10-PCS | Performed by: ADVANCED PRACTICE MIDWIFE

## 2019-05-30 PROCEDURE — 85018 HEMOGLOBIN: CPT | Performed by: ADVANCED PRACTICE MIDWIFE

## 2019-05-30 PROCEDURE — 72200001 ZZH LABOR CARE VAGINAL DELIVERY SINGLE

## 2019-05-30 RX ORDER — BISACODYL 10 MG
10 SUPPOSITORY, RECTAL RECTAL DAILY PRN
Status: DISCONTINUED | OUTPATIENT
Start: 2019-06-01 | End: 2019-05-31 | Stop reason: HOSPADM

## 2019-05-30 RX ORDER — AMOXICILLIN 250 MG
1 CAPSULE ORAL 2 TIMES DAILY
Status: DISCONTINUED | OUTPATIENT
Start: 2019-05-30 | End: 2019-05-31 | Stop reason: HOSPADM

## 2019-05-30 RX ORDER — OXYTOCIN 10 [USP'U]/ML
10 INJECTION, SOLUTION INTRAMUSCULAR; INTRAVENOUS
Status: DISCONTINUED | OUTPATIENT
Start: 2019-05-30 | End: 2019-05-31 | Stop reason: HOSPADM

## 2019-05-30 RX ORDER — OXYTOCIN/0.9 % SODIUM CHLORIDE 30/500 ML
340 PLASTIC BAG, INJECTION (ML) INTRAVENOUS CONTINUOUS PRN
Status: DISCONTINUED | OUTPATIENT
Start: 2019-05-30 | End: 2019-05-31 | Stop reason: HOSPADM

## 2019-05-30 RX ORDER — AMOXICILLIN 250 MG
2 CAPSULE ORAL 2 TIMES DAILY
Status: DISCONTINUED | OUTPATIENT
Start: 2019-05-30 | End: 2019-05-31 | Stop reason: HOSPADM

## 2019-05-30 RX ORDER — IBUPROFEN 800 MG/1
800 TABLET, FILM COATED ORAL EVERY 6 HOURS PRN
Status: DISCONTINUED | OUTPATIENT
Start: 2019-05-30 | End: 2019-05-31 | Stop reason: HOSPADM

## 2019-05-30 RX ORDER — OXYTOCIN/0.9 % SODIUM CHLORIDE 30/500 ML
100 PLASTIC BAG, INJECTION (ML) INTRAVENOUS CONTINUOUS
Status: DISCONTINUED | OUTPATIENT
Start: 2019-05-30 | End: 2019-05-31 | Stop reason: HOSPADM

## 2019-05-30 RX ORDER — NALOXONE HYDROCHLORIDE 0.4 MG/ML
.1-.4 INJECTION, SOLUTION INTRAMUSCULAR; INTRAVENOUS; SUBCUTANEOUS
Status: DISCONTINUED | OUTPATIENT
Start: 2019-05-30 | End: 2019-05-31 | Stop reason: HOSPADM

## 2019-05-30 RX ORDER — LANOLIN 100 %
OINTMENT (GRAM) TOPICAL
Status: DISCONTINUED | OUTPATIENT
Start: 2019-05-30 | End: 2019-05-31 | Stop reason: HOSPADM

## 2019-05-30 RX ORDER — ACETAMINOPHEN 325 MG/1
650 TABLET ORAL EVERY 4 HOURS PRN
Status: DISCONTINUED | OUTPATIENT
Start: 2019-05-30 | End: 2019-05-31 | Stop reason: HOSPADM

## 2019-05-30 RX ORDER — HYDROCORTISONE 2.5 %
CREAM (GRAM) TOPICAL 3 TIMES DAILY PRN
Status: DISCONTINUED | OUTPATIENT
Start: 2019-05-30 | End: 2019-05-31 | Stop reason: HOSPADM

## 2019-05-30 RX ADMIN — IBUPROFEN 800 MG: 800 TABLET ORAL at 01:11

## 2019-05-30 RX ADMIN — IBUPROFEN 800 MG: 800 TABLET ORAL at 16:38

## 2019-05-30 RX ADMIN — IBUPROFEN 800 MG: 800 TABLET ORAL at 23:02

## 2019-05-30 RX ADMIN — SENNOSIDES AND DOCUSATE SODIUM 2 TABLET: 8.6; 5 TABLET ORAL at 09:03

## 2019-05-30 RX ADMIN — OXYTOCIN-SODIUM CHLORIDE 0.9% IV SOLN 30 UNIT/500ML 100 ML/HR: 30-0.9/5 SOLUTION at 00:58

## 2019-05-30 RX ADMIN — ACETAMINOPHEN 650 MG: 325 TABLET, FILM COATED ORAL at 16:38

## 2019-05-30 RX ADMIN — IBUPROFEN 800 MG: 800 TABLET ORAL at 07:43

## 2019-05-30 RX ADMIN — ACETAMINOPHEN 650 MG: 325 TABLET, FILM COATED ORAL at 20:08

## 2019-05-30 RX ADMIN — SENNOSIDES AND DOCUSATE SODIUM 2 TABLET: 8.6; 5 TABLET ORAL at 20:08

## 2019-05-30 RX ADMIN — LIDOCAINE HYDROCHLORIDE 10 ML: 10 INJECTION, SOLUTION EPIDURAL; INFILTRATION; INTRACAUDAL; PERINEURAL at 00:33

## 2019-05-30 NOTE — PLAN OF CARE
VSS. Postpartum checks WDL. Up with SBA. Voiding without difficulty. Pain managed with ibuprofen. Infant disinterested in breastfeeding, pushing off breast and becoming fussy. Able to easily hand-express 10ml of colostrum and spoon feed it to infant.

## 2019-05-30 NOTE — PLAN OF CARE
Pt alert, active, and stable. No signs or symptoms of distress. VSS. Denies pain after epidural. See flow sheet for FHR interpretation. Catherine at bedside for decels. O2 administered, bolus given, and pit off. FHR returned to baseline after interventions. No questions or concerns at this time. Will continue to monitor pt closely.

## 2019-05-30 NOTE — PROGRESS NOTES
Post Partum Note    Diamante Thompson      MRN#: [id number]  Age: 23 year old      YOB: 1995      Post-partum day #0     16 hrs post partum    SIGNIFICANT PROBLEMS:  Patient Active Problem List    Diagnosis Date Noted      (normal spontaneous vaginal delivery) 2019     Priority: Medium     Labor and delivery, indication for care 2019     Priority: Medium     Need for Tdap vaccination 2019     Priority: Medium     Encounter for triage in pregnant patient 2018     Priority: Medium       INTERVAL HISTORY:  /79   Pulse 82   Temp 98.9  F (37.2  C) (Oral)   Resp 18   LMP 2018   SpO2 97%   Breastfeeding? Unknown     Pt stable, baby is rooming in  Breast feeding status:initiated  Complications since 2 hours post delivery: None  Patient is tolerating acitivity well Voiding without difficulty, cramping is relieved by Ibuprophen, lochia is decreasing and patient denies clots.  Perineal pain is is minimal and is relieved by Ibuprophen.  The perineum laceration is well approximated    Normal postpartum exam    Postpartum hemoglobin   Hemoglobin   Date Value Ref Range Status   2019 13.3 11.7 - 15.7 g/dL Final     Blood type   Lab Results   Component Value Date    ABO B 2019       Lab Results   Component Value Date    RH Pos 2019     Rubella status   Lab Results   Component Value Date    RUBELLAABIGG normal 2019    RUBELLAABIGG immune 2019       ASSESSMENT/PLAN:  Stable Post-partum day #1  Complications:   Isolation for hx of ESBL with hx of positive culture in 2018 but Negative in 10/2018.  Plan d/c home tomorrow  RTC 6  Teaching done: D/C Instructions: Nutrition/Activity, Engorgement Management, Birth Control Options, Warning Signs/When to Call: Excessive Bleeding, Infection, PP Depression, Kegals and Crunches, RTC Clinic for PP Appointment and PNV    Postpartum warning s/s reviewed, including bleeding/clots, fever, mastitis, or  depression    Birthcontrol planned:None  Current Discharge Medication List      CONTINUE these medications which have NOT CHANGED    Details   acetaminophen (TYLENOL) 500 MG tablet Take 1,000 mg by mouth once      emollient (VANICREAM) external cream Apply topically 2 times daily Entire body  Qty: 453 g, Refills: 5    Comments: Use for body wash Vanicream or free&clear soap  Associated Diagnoses: Besnier's prurigo      folic acid (FOLVITE) 1 MG tablet Take 1 mg by mouth daily      Prenatal Vit-Fe Fumarate-FA (PRENATAL MULTIVITAMIN W/IRON) 27-0.8 MG tablet Take 1 tablet by mouth daily  Qty: 90 tablet, Refills: 3    Associated Diagnoses: Encounter for supervision of normal first pregnancy in second trimester      benzoyl peroxide (PANOXYL) 10 % external liquid Wash face 1-2 times daily  Qty: 187 g, Refills: 3    Associated Diagnoses: Other acne      cetirizine (ZYRTEC) 10 MG tablet 1-2 times daily against itching  Qty: 50 tablet, Refills: 1    Comments: Discuss with obstetrician  Associated Diagnoses: Besnier's prurigo      ipratropium (ATROVENT) 0.03 % nasal spray Spray 2 sprays into both nostrils every 12 hours  Qty: 30 mL, Refills: 0    Associated Diagnoses: Acute nasopharyngitis; Pregnancy rhinitis      sulfacetamide sodium-sulfur 10-5 % LOTN 1-2 times daily in face  Qty: 60 g, Refills: 1    Associated Diagnoses: Other acne         STOP taking these medications       triamcinolone (KENALOG) 0.1 % external cream Comments:   Reason for Stopping:

## 2019-05-30 NOTE — L&D DELIVERY NOTE
Delivery Summary  Variable decelerations continued after last assessment of patient so patient was instructed on pushing and second stage initiated at 2325. Pt pushed effectively over the next hour to the birth of a vigorous baby girl. NICU was present for birth and stayed through the one minute apgar but baby was able to stay skin-to-skin with mom. A right labial laceration was repaired with a 4-0 Vicryl.   IUP at 39 weeks 0 days gestation delivered on 2019 @ 0022.     delivery of a viable female infant; weight pending at time of note.  Apgars pending at time of note  Labor was augmented with pitocin for dysfunctional labor pattern.  Medications administered  in labor:  Pain Rx Epidural; Antibiotics No  Perineum: Right periurethral laceration  Placenta-mechanism: spontaneous, intact,  with a 3 vessel cord. IV oxytocin was given.  QBL was 164.  Complications of pregnancy, labor and delivery: Dysfunctional labor  Birth attendants: Catherine Cochran CNM  Gamalielestelle MANISHA Thompson MRN# [id number]   Age: 23 year old YOB: 1995     ASSESSMENT & PLAN:       Labor Event Times    Labor onset date:  19 Onset time:   7:35 PM   Dilation complete date:  19 Complete time:  11:24 PM   Start pushing date/time:  2019 2324      Labor Length    1st Stage (hrs):  3 (min):  49   2nd Stage (hrs):  0 (min):  58   3rd Stage (hrs):  0 (min):  6      Labor Events     labor?:  No   steroids:  None  Labor Type:  Spontaneous, Augmentation, AROM  Predominate monitoring during 1st stage:  continuous electronic fetal monitoring     Antibiotics received during labor?:  No     Rupture identifier:  Sac 1  Rupture date/time: 19 1711   Rupture type:  Artificial Rupture of Membranes  Fluid color:  Meconium  Fluid odor:  Normal     Augmentation:  AROM, Oxytocin  Indications for augmentation:  Ineffective Contraction Pattern  1:1 continuous labor support provided by?:  RN Labor partogram used?:  no       Delivery/Placenta Date and Time    Delivery Date:  19 Delivery Time:  12:22 AM   Placenta Date/Time:  2019 12:28 AM  Oxytocin given at the time of delivery:  after delivery of placenta     Vaginal Counts     Initial count performed by 2 team members:   Two Team Members   KAREN Murphy CNM       Needles Suture [employer] Sponges Instruments   Initial counts 2  5    Added to count  1     Final counts 2 1 5    Placed during labor Accounted for at the end of labor   No NA   No NA   No NA    Final count performed by 2 team members:   Two Team Members   KAREN Murphy CNM      Final count correct?:  Yes     Apgars    Living status:  Living   1 Minute 5 Minute 10 Minute 15 Minute 20 Minute   Skin color:        Heart rate:        Reflex irritability:        Muscle tone:        Respiratory effort:        Total:           Cord    Vessels:  3 Vessels Complications:  None   Cord Blood Disposition:  Lab Gases Sent?:  No      [place] Resuscitation    Methods:  None  Output in Delivery Room:  Stool     Skin to Skin and Feeding Plan    Skin to skin initiation date/time:     Skin to skin with:  Mother  Skin to skin end date/time:     How do you plan to feed your baby:  Breastfeeding     Labor Events and Shoulder Dystocia    Fetal Tracing Prior to Delivery:  Category 2  Fetal Tracing Comments:  persistent variables throughout second stage  Shoulder dystocia present?:  Neg     Delivery (Maternal) (Provider to Complete) ([id number])    Episiotomy:  None  Perineal lacerations:  None    Periurethral laceration:  right Repaired?:  Yes   Vaginal laceration?:  No    Cervical laceration?:  No       Blood Loss  Mother: Diamante Thompson #[id number]   Start of Mother's Information    IO Blood Loss  19 1935 - 19 0109    Total QBL Blood Loss (mL) Hospital Encounter 164 mL    Total  164 mL         End of Mother's Information  Mother: Diamante Thompson #[id number]         Delivery - Provider to Complete ([id number])     Delivering clinician:  Catherine Cochran APRN CNM CNM Care:  Exclusive CNM care in labor  Attempted Delivery Types (Choose all that apply):  Spontaneous Vaginal Delivery  Delivery Type (Choose the 1 that will go to the Birth History):  Vaginal, Spontaneous   Other personnel:   Provider Role   Catherine Cochran APRN CNM Certified Nurse Midwife   Gretel Delgado, RN Registered Nurse   Malinda Elaine RN Registered Nurse         Placenta    Delayed Cord Clamping:  Done  Date/Time:  5/30/2019 12:28 AM  Removal:  Spontaneous  Comments:  intact  Disposition:  Hospital disposal     Anesthesia    Method:  Nitrous Oxide, Epidural  Cervical dilation at placement:  4-7   Analgesic:   BIRTH HISTORY: ANALGESIC   LIDOCAINE 1 % INJECTION         Presentation and Position    Presentation:  Vertex  Position:  Right Occiput Anterior           SARA Curtis CNM

## 2019-05-30 NOTE — PROGRESS NOTES
S:Called to room after 3 minute prolonged decel. Difficult to determine juan due to broken tracing but looks to be down to 80's-90's for about one minute before a return to baseline. This happened after patient was lying on her back for straight cath. Resolved with O2 and position change, pitocin turned off.    O:  Blood pressure 109/74, temperature 99  F (37.2  C), temperature source Oral, resp. rate 18, last menstrual period 08/30/2018, SpO2 100 %, not currently breastfeeding.  General appearance: comfortable    CONTRACTIONS: difficult to determine while patient lying on side  FETAL HEART TONES: baseline 130 with moderate FHR variability and    accelerations yes. Decelerations yes.    ROM: moderate meconium fluid  PELVIC EXAM:anterior lip/100/+2  Fetal Position:  vertex  Bloody show: No  Pitocin- none,  Antibiotics- none    ASSESSMENT:  ==============  IUP @ 38w6d active labor, nearing second stage   Fetal Heart rate tracing Category two  GBS- negative  Meconium stained fluid  Pitocin augmentation      PLAN:  ===========  Comfort measures prn - continue with position changes as tolerated  Labor augmentation with Pitocin; will allow some time for fetal recovery and turn back on once able to determine contraction frequency more reliably. Titrate per unit protocol.   Reevaluate in 2-3 hours/PRN   Catherine Cochran CNM

## 2019-05-30 NOTE — ANESTHESIA PROCEDURE NOTES
Epidural Procedure Note    Staff:     Anesthesiologist:  Nicole Montoya MD    Resident/CRNA:  Lacie Munoz MD    Procedure performed by resident/CRNA in the presence of a teaching physician    Location: floor and OB   Pre-procedure checklist:   patient identified, IV checked, site marked, risks and benefits discussed, informed consent, monitors and equipment checked, pre-op evaluation, at physician/surgeon's request and post-op pain management      Correct Patient: Yes      Correct Position: Yes      Correct Site: Yes      Correct Procedure: Yes      Correct Laterality:  N/A    Site Marked:  N/A  Procedure:     Procedure:  Epidural catheter    ASA:  2    Position:  Sitting    Sterile Prep: chloraprep, mask, sterile gloves and patient draped      Insertion site:  L3-4    Approach:  Midline    Needle gauge (G):  17    Needle Length (in):  5    Block Needle Type:  Touhy    Injection Technique:  LORT saline    SANCHO at (cm):  5    Attempts:  1    Catheter gauge (G):  19    Catheter threaded easily: Yes      Threaded to cm at skin:  9    Threaded in epidural space (cm):  4    Paresthesias:  No    Aspiration negative for Heme or CSF: Yes      Test dose (mL):  3     Local anesthetic:  Lidocaine 1.5% w/ 1:200,000 epinephrine    Test dose negative for signs of intravascular, subdural or intrathecal injection: Yes

## 2019-05-30 NOTE — ANESTHESIA PREPROCEDURE EVALUATION
"Anesthesia Pre-Procedure Evaluation    Patient: Diamante Thompson   MRN:     [id number] Gender:   female   Age:    23 year old :      1995        Preoperative Diagnosis: * No surgery found *        History reviewed. No pertinent past medical history.   History reviewed. No pertinent surgical history.       Anesthesia Evaluation       history and physical reviewed .      No history of anesthetic complications          ROS/MED HX    ENT/Pulmonary:  - neg pulmonary ROS     Neurologic:  - neg neurologic ROS     Cardiovascular:  - neg cardiovascular ROS       METS/Exercise Tolerance:     Hematologic:         Musculoskeletal:         GI/Hepatic:  - neg GI/hepatic ROS       Renal/Genitourinary:         Endo:         Psychiatric:         Infectious Disease:         Malignancy:         Other:                     JZG F[facility] AN PHYSICAL EXAM    Lab Results   Component Value Date    WBC 14.8 (H) 2019    HGB 13.3 2019    HCT 39.2 2019     2019    HCG NEGATIVE 2016       Preop Vitals  BP Readings from Last 3 Encounters:   19 125/75   19 106/73   19 111/77    Pulse Readings from Last 3 Encounters:   19 87   19 95   19 98      Resp Readings from Last 3 Encounters:   19 18   19 16   03/15/19 18    SpO2 Readings from Last 3 Encounters:   19 97%   19 96%   19 98%      Temp Readings from Last 1 Encounters:   19 37.1  C (98.7  F) (Oral)    Ht Readings from Last 1 Encounters:   19 1.575 m (5' 2\")      Wt Readings from Last 1 Encounters:   19 88.3 kg (194 lb 9.6 oz)    Estimated body mass index is 35.59 kg/m  as calculated from the following:    Height as of an earlier encounter on 19: 1.575 m (5' 2\").    Weight as of an earlier encounter on 19: 88.3 kg (194 lb 9.6 oz).     LDA:  Peripheral IV 19 Right;Dorsal Hand (Active)   Site Assessment WDL 2019  6:00 PM   Line Status Saline locked 2019  " 6:00 PM   Phlebitis Scale 0-->no symptoms 5/29/2019  6:00 PM   Infiltration Scale 0 5/29/2019  6:00 PM   Number of days: 0            JJAVIG FV AN PLAN NO PONV RULE    neg OB ROS                   Nicole Andrews MD

## 2019-05-30 NOTE — PROGRESS NOTES
S:Pt resting comfortably after epidural placement at 2010.     O:  Blood pressure 128/77, temperature 97.9  F (36.6  C), temperature source Oral, resp. rate 16, last menstrual period 08/30/2018, SpO2 97 %, not currently breastfeeding.  General appearance: comfortable    CONTRACTIONS: Contractions every 3-6 minutes.  Palpate: moderate  FETAL HEART TONES: baseline 130 with moderate FHR variability and    accelerations yes. Decelerations yes - occasional variables.    NST: REACTIVE  ROM: moderate meconium fluid  PELVIC EXAM:7/90/+1  Fetal Position:  vertex  Bloody show: No  Pitocin- none,  Antibiotics- none    ASSESSMENT:  ==============  IUP @ 38w6d active labor   Fetal Heart rate tracing Category one  GBS- negative  Meconium stained fluid     PLAN:  ===========  Comfort measures prn - reposition in lateral position with peanut ball  Labor augmentation with Pitocin - discussed using pitocin to bring contractions closer together as second stage can be difficult with contractions that are 6 minutes apart. Pt agrees to pitocin.   Reevaluate in 2-4 hours/PRN   Catherine Cochran CNM

## 2019-05-30 NOTE — LACTATION NOTE
This note was copied from a baby's chart.  Consult for : First time breastfeeding    Delivery Information: Infant born at 39.0 weeks via vaginal delivery this morning at 0022.    Maternal Health History: Diamante has no significant health history    Maternal Breast Exam:  Diamante noted breast growth in early pregnancy and she has been able to express about 7-10ml of colostrum after each feeding. Her breasts are soft and symmetrical with bilateral intact, everted nipples. She denies any discomfort when breastfeeding. ?    Infant information: Infant has good output. She has been sleepy but is only just over 12 hours of age. Her birthweight was 5 lb 12 oz and she has some head molding.  I was unable to assess her suck due to disinterest in sucking at the time of visit. She does appear to suck on her tongue frequently. ?    Feeding Assessment: Diamante was able to identify that infant was cueing to breastfeed. I assisted Diamante with positioning infant and her hands and demonstrated how to sandwich her breast. Diamante was easily able to express colostrum and with minimal guidance was able to achieve an asymmetric latch. Infant was able to sustain latch, was heard swallowing and Diamante denied discomfort.    Education: early feeding cues, benefits of feeding on cue, breastfeeding positions, signs breastfeeding is going well (comfortable latch, age appropriate output and weight loss, swallowing heard at the breast), satiety cues, expected  output,  weight loss, nutritive vs non-nutritive sucking, benefits of skin to skin, the Second Night, benefits of breast massage and hand expression of colostrum, benefit of supplementing infant with expressed milk as infant <6lbs at birth,  inpatient lactation support and outpatient lactation resources.   Supplement Guidelines for infants <37 weeks gestation or < 6 lbs at birth per the [place] Feeding Methods for the  Infant (35-42 weeks) Policy ([facility]  Guidelines):  Infants <37 weeks OR <6 pounds   Birth-24 hours of age: 5ml (1 tsp) every 2 - 3 hours, at least 8 times in 24 hours   24-48 hours of age: 10 ml (2 tsp) every 2 - 3 hours, at least 8 times in 24 hours   48-72 hours of age: 15 ml (3 tsp) every 2 - 3 hours, at least 8 times in 24 hours    Plan: Continue breastfeeding on cue with RN support as needed with a goal of 8-12 feedings per day. Encourage frequent skin to skin as well as  hand expression after each feeding. As infant <6lbs at birth and mother able to express good amounts of colostrum, continue hand expression and spoon feeding colostrum.    [facility] and was encouraged to contact the LC there for outpatient support as needed after discharge.

## 2019-05-30 NOTE — PLAN OF CARE
Data: Diamante Thompson transferred to [id number] via wheelchair at 0300. Baby transferred via parent's arms.  Action: Receiving unit notified of transfer: Yes. Patient and family notified of room change. Report given to KAREN Terry at 0308. Belongings sent to receiving unit. Accompanied by Registered Nurse. Oriented patient to surroundings. Call light within reach. ID bands double-checked with receiving RN.  Response: Patient tolerated transfer and is stable.

## 2019-05-30 NOTE — PLAN OF CARE
Vital signs stable. Postpartum assessment WDL. Pain controlled with Ibuprofen. Patient voiding without difficulty. Breastfeeding on cue with assistance provided with latch and hand expression. Patient and infant bonding well. Will continue with current plan of care.

## 2019-05-30 NOTE — PLAN OF CARE
Patient arrived to [facility] unit via wheelchair at 0300,with belongings, accompanied by family, with infant in arms. Received report from KAREN Arboleda  and checked bands. Unit and room orientation completd. Call light given; no concerns present at this time. Continue with plan of care.

## 2019-05-31 VITALS
HEART RATE: 84 BPM | TEMPERATURE: 98.3 F | SYSTOLIC BLOOD PRESSURE: 114 MMHG | OXYGEN SATURATION: 97 % | DIASTOLIC BLOOD PRESSURE: 71 MMHG | RESPIRATION RATE: 16 BRPM

## 2019-05-31 PROCEDURE — 25000132 ZZH RX MED GY IP 250 OP 250 PS 637: Performed by: ADVANCED PRACTICE MIDWIFE

## 2019-05-31 RX ORDER — IBUPROFEN 800 MG/1
800 TABLET, FILM COATED ORAL EVERY 6 HOURS PRN
Qty: 30 TABLET | Refills: 0 | Status: SHIPPED | OUTPATIENT
Start: 2019-05-31 | End: 2022-06-24

## 2019-05-31 RX ORDER — AMOXICILLIN 250 MG
1 CAPSULE ORAL 2 TIMES DAILY PRN
Qty: 30 TABLET | Refills: 1 | Status: SHIPPED | OUTPATIENT
Start: 2019-05-31 | End: 2022-06-24

## 2019-05-31 RX ADMIN — ACETAMINOPHEN 650 MG: 325 TABLET, FILM COATED ORAL at 02:06

## 2019-05-31 RX ADMIN — IBUPROFEN 800 MG: 800 TABLET ORAL at 11:18

## 2019-05-31 RX ADMIN — SENNOSIDES AND DOCUSATE SODIUM 1 TABLET: 8.6; 5 TABLET ORAL at 11:18

## 2019-05-31 RX ADMIN — IBUPROFEN 800 MG: 800 TABLET ORAL at 05:35

## 2019-05-31 NOTE — PLAN OF CARE
Pt stable. Independent with self and infant cares, breastfeeding well.. Discharge instructions reviewed with patient. Homecare referral offered and accepted. Pt verbalized understanding of d/c instructions and states all questions answered. Picking up discharge meds at [facility]. Discharge to home at 1400. Plan for follow-up in clinic in 6 weeks.

## 2019-05-31 NOTE — PLAN OF CARE
Data: Vital signs within normal limits. Postpartum checks within normal limits - see flow record. Patient eating and drinking normally. Patient able to empty bladder independently and is up ambulating. UA sent. + ESBL in 2018 neg x1 since. Contact ISO maintained per protocol and pt education documented. No apparent signs of infection. Perineum  healing well. Patient performing self cares and is able to care for infant however first time mother and requires a lot of education and reinforcement. Breastfeeding with full assist.  Is sleepy and disinterested in latching. Mother is independent with hand expressing and needs continued education with feeding and  cares.   Action: Patient medicated during the shift for pain and cramping. See MAR. Patient reassessed within 1 hour after each medication and pain was improved - patient stated she was comfortable. Family present.   Plan: Anticipate discharge on .

## 2019-05-31 NOTE — PROVIDER NOTIFICATION
05/31/19 1100   Provider Notification   Provider Name/Title HAMLET Potter   Method of Notification In Department   Notification Reason Status Update   Pt will discharge home today, will  discharge meds from [facility].

## 2019-05-31 NOTE — DISCHARGE SUMMARY
Post Partum Note  SIGNIFICANT PROBLEMS:  Patient Active Problem List    Diagnosis Date Noted      (normal spontaneous vaginal delivery) 2019     Priority: Medium     ESBL (extended spectrum beta-lactamase) producing bacteria infection 2019     Priority: Medium     ESBL e-coli in 2018 in OR.   UC Negative in 10/2018.   Repeat UC this hospitalization.          Labor and delivery, indication for care 2019     Priority: Medium     Need for Tdap vaccination 2019     Priority: Medium     Encounter for triage in pregnant patient 2018     Priority: Medium       INTERVAL HISTORY:  /71   Pulse 84   Temp 98.3  F (36.8  C) (Oral)   Resp 16   LMP 2018   SpO2 97%   Breastfeeding? Unknown   Pt stable, baby is rooming in  Breast feeding status:initiated  Complications since 2 hours post delivery: None  Patient is tolerating acitivity well Voiding without difficulty, cramping is relieved by Ibuprophen, lochia is decreasing and patient denies clots.  Perineal pain is is relieved by Ibuprophen.  The perineum laceration is well approximated    Postpartum hemoglobin   Hemoglobin   Date Value Ref Range Status   2019 11.5 (L) 11.7 - 15.7 g/dL Final     Blood type   Lab Results   Component Value Date    ABO B 2019       Lab Results   Component Value Date    RH Pos 2019     Rubella status   Lab Results   Component Value Date    RUBELLAABIGG normal 2019    RUBELLAABIGG immune 2019       ASSESSMENT/PLAN:  Normal postpartum exam   Stable Post-partum day #2  Complications:none  Plan d/c home today  RTC 6 weeks  Teaching done: D/C Instructions: Nutrition/Activity, Engorgement Management, Birth Control Options, Warning Signs/When to Call: Excessive Bleeding, Infection, PP Depression, Kegals and Crunches, RTC Clinic for PP Appointment and PNV  Birthcontrol planned: Considering IUDs - ParaGard, David Bauer.  Discussed that she can make her appt at 8 weeks if she  wants an IUD placed at the same visit.    Current Discharge Medication List      CONTINUE these medications which have NOT CHANGED    Details   acetaminophen (TYLENOL) 500 MG tablet Take 1,000 mg by mouth once      emollient (VANICREAM) external cream Apply topically 2 times daily Entire body  Qty: 453 g, Refills: 5    Comments: Use for body wash Vanicream or free&clear soap  Associated Diagnoses: Besnier's prurigo      folic acid (FOLVITE) 1 MG tablet Take 1 mg by mouth daily      Prenatal Vit-Fe Fumarate-FA (PRENATAL MULTIVITAMIN W/IRON) 27-0.8 MG tablet Take 1 tablet by mouth daily  Qty: 90 tablet, Refills: 3    Associated Diagnoses: Encounter for supervision of normal first pregnancy in second trimester      benzoyl peroxide (PANOXYL) 10 % external liquid Wash face 1-2 times daily  Qty: 187 g, Refills: 3    Associated Diagnoses: Other acne      cetirizine (ZYRTEC) 10 MG tablet 1-2 times daily against itching  Qty: 50 tablet, Refills: 1    Comments: Discuss with obstetrician  Associated Diagnoses: Besnier's prurigo      ipratropium (ATROVENT) 0.03 % nasal spray Spray 2 sprays into both nostrils every 12 hours  Qty: 30 mL, Refills: 0    Associated Diagnoses: Acute nasopharyngitis; Pregnancy rhinitis      sulfacetamide sodium-sulfur 10-5 % LOTN 1-2 times daily in face  Qty: 60 g, Refills: 1    Associated Diagnoses: Other acne         STOP taking these medications       triamcinolone (KENALOG) 0.1 % external cream Comments:   Reason for Stopping:             Ordered ibuprofen and colace to her pharmacy by her house per her request.      Sophie Potter CNM

## 2019-05-31 NOTE — PLAN OF CARE
Pt stable this shift with postpartum checks. Pt is breastfeeding baby only on the left side because she has a nipple piercing on right nipple that she can't get out. Pt is pumping and hand expressing on right side. Pt had postpartum tubal and incision looks good no drainage. Pt is having good pain relief .  Will continue to monitor for changes and assist as needed.

## 2019-06-01 ENCOUNTER — TELEPHONE (OUTPATIENT)
Dept: NURSING | Facility: CLINIC | Age: 24
End: 2019-06-01

## 2019-06-01 LAB
BACTERIA SPEC CULT: NORMAL
Lab: NORMAL
SPECIMEN SOURCE: NORMAL

## 2019-07-19 ENCOUNTER — DOCUMENTATION ONLY (OUTPATIENT)
Dept: FAMILY MEDICINE | Facility: CLINIC | Age: 24
End: 2019-07-19

## 2019-12-10 ENCOUNTER — TRANSFERRED RECORDS (OUTPATIENT)
Dept: MULTI SPECIALTY CLINIC | Facility: CLINIC | Age: 24
End: 2019-12-10

## 2019-12-10 LAB
ALT SERPL-CCNC: 17 U/L (ref 10–35)
AST SERPL-CCNC: 17 U/L (ref 8–39)
C TRACH DNA SPEC QL PROBE+SIG AMP: NEGATIVE
CHOLEST SERPL-MCNC: 149 MG/DL (ref 108–199)
CREAT SERPL-MCNC: 0.71 MG/DL (ref 0.6–1.1)
GFR SERPL CREATININE-BSD FRML MDRD: >60 ML/MIN
GLUCOSE SERPL-MCNC: 79 MG/DL (ref 74–100)
HBA1C MFR BLD: 5.1 % (ref 0–5.6)
HDLC SERPL-MCNC: 49 MG/DL (ref 40–125)
HEP C HIM: NORMAL
HIV 1&2 EXT: NORMAL
LDLC SERPL CALC-MCNC: 84 MG/DL (ref 0–99)
N GONORRHOEA DNA SPEC QL PROBE+SIG AMP: NEGATIVE
POTASSIUM SERPL-SCNC: 3.8 MMOL/L (ref 3.6–5)
SPECIMEN DESCRIP: NORMAL
SPECIMEN DESCRIPTION: NORMAL
TRIGL SERPL-MCNC: 82 MG/DL (ref 26–149)
TSH SERPL-ACNC: 4.44 MU/L (ref 0.4–4)

## 2020-01-20 ENCOUNTER — DOCUMENTATION ONLY (OUTPATIENT)
Dept: FAMILY MEDICINE | Facility: CLINIC | Age: 25
End: 2020-01-20

## 2022-02-09 ENCOUNTER — OFFICE VISIT (OUTPATIENT)
Dept: MIDWIFE SERVICES | Facility: CLINIC | Age: 27
End: 2022-02-09
Payer: COMMERCIAL

## 2022-02-09 VITALS
OXYGEN SATURATION: 98 % | SYSTOLIC BLOOD PRESSURE: 110 MMHG | DIASTOLIC BLOOD PRESSURE: 64 MMHG | HEART RATE: 86 BPM | BODY MASS INDEX: 37.13 KG/M2 | WEIGHT: 203 LBS

## 2022-02-09 DIAGNOSIS — Z30.432 ENCOUNTER FOR REMOVAL OF INTRAUTERINE CONTRACEPTIVE DEVICE: ICD-10-CM

## 2022-02-09 DIAGNOSIS — Z12.4 CERVICAL CANCER SCREENING: Primary | ICD-10-CM

## 2022-02-09 DIAGNOSIS — L74.9 SWEATING ABNORMALITY: ICD-10-CM

## 2022-02-09 PROCEDURE — G0145 SCR C/V CYTO,THINLAYER,RESCR: HCPCS | Performed by: ADVANCED PRACTICE MIDWIFE

## 2022-02-09 PROCEDURE — 58301 REMOVE INTRAUTERINE DEVICE: CPT | Performed by: ADVANCED PRACTICE MIDWIFE

## 2022-02-09 NOTE — PROGRESS NOTES
IUD Removal:  SUBJECTIVE:    Is a pregnancy test required: No.   Pt had LMP 5 days prior to removal  Was a consent obtained?  Yes    Diamante Thompson is a 26 year old female,, Patient's last menstrual period was 2022. who presents today for IUD removal. Her current IUD was placed 2.25 ago. She has had problems including feeling like the IUD is being felt in her cervix with squating and voiding and intercourse.  Feels she can feel hard end and partner noted the same.   with the IUD. She requests removal of the IUD because she desires to conceive, she wants to change her method of contraception and of problems stated above.  Possible conceive in next 6 months so not worried about pregnancy.        Today's PHQ-2 Score:   PHQ-2 (  [employer]) 2019   Q1: Little interest or pleasure in doing things 0   Q2: Feeling down, depressed or hopeless 0   PHQ-2 Score 0   PHQ-2 Total Score (12-17 Years)- Positive if 3 or more points; Administer PHQ-A if positive 0         PROCEDURE:    A speculum exam was performed and the cervix was visualized.   The IUD string was exceptionally longer than I would have expected.  The IUD string was visualized. Using ring forceps, the string  was grasped and the IUD removed intact.  The based of the IUD and the location of the forceps was consistant with expulsion as only 1 cm from forceps.       Diamante Thompson is also due for a pap smear.  Last pap was 3 yrs ago.   Pap obtained without any issue.            POST PROCEDURE:    The patient tolerated the procedure well. Patient was discharged in stable condition.    Call if bleeding, pain or fever occur., Birth control counseling given., Pregnancy counseling given, including folic acid supplementation 800-1000 mg per day. and Use of condoms/foam discussed.    Francesco Aceves, SARA CNM

## 2022-02-11 LAB
BKR LAB AP GYN ADEQUACY: NORMAL
BKR LAB AP GYN INTERPRETATION: NORMAL
BKR LAB AP HPV REFLEX: NORMAL
BKR LAB AP LMP: NORMAL
BKR LAB AP PREVIOUS ABNORMAL: NORMAL
PATH REPORT.COMMENTS IMP SPEC: NORMAL
PATH REPORT.COMMENTS IMP SPEC: NORMAL
PATH REPORT.RELEVANT HX SPEC: NORMAL

## 2022-04-28 ENCOUNTER — LAB (OUTPATIENT)
Dept: LAB | Facility: CLINIC | Age: 27
End: 2022-04-28
Payer: COMMERCIAL

## 2022-04-28 ENCOUNTER — OFFICE VISIT (OUTPATIENT)
Dept: DERMATOLOGY | Facility: CLINIC | Age: 27
End: 2022-04-28
Attending: ADVANCED PRACTICE MIDWIFE
Payer: COMMERCIAL

## 2022-04-28 ENCOUNTER — MEDICAL CORRESPONDENCE (OUTPATIENT)
Dept: HEALTH INFORMATION MANAGEMENT | Facility: CLINIC | Age: 27
End: 2022-04-28

## 2022-04-28 DIAGNOSIS — L74.519 FOCAL HYPERHIDROSIS: Primary | ICD-10-CM

## 2022-04-28 DIAGNOSIS — L74.9 SWEATING ABNORMALITY: ICD-10-CM

## 2022-04-28 LAB
FASTING STATUS PATIENT QL REPORTED: YES
GLUCOSE BLD-MCNC: 112 MG/DL (ref 70–99)
HBA1C MFR BLD: 5.2 % (ref 0–5.6)
TSH SERPL DL<=0.005 MIU/L-ACNC: 2.16 MU/L (ref 0.4–4)

## 2022-04-28 PROCEDURE — 82947 ASSAY GLUCOSE BLOOD QUANT: CPT | Performed by: PATHOLOGY

## 2022-04-28 PROCEDURE — 83036 HEMOGLOBIN GLYCOSYLATED A1C: CPT | Performed by: PATHOLOGY

## 2022-04-28 PROCEDURE — 36415 COLL VENOUS BLD VENIPUNCTURE: CPT | Performed by: PATHOLOGY

## 2022-04-28 PROCEDURE — 99204 OFFICE O/P NEW MOD 45 MIN: CPT | Mod: GC | Performed by: STUDENT IN AN ORGANIZED HEALTH CARE EDUCATION/TRAINING PROGRAM

## 2022-04-28 PROCEDURE — 84443 ASSAY THYROID STIM HORMONE: CPT | Performed by: PATHOLOGY

## 2022-04-28 RX ORDER — GLYCOPYRROLATE 1 MG/1
1 TABLET ORAL 2 TIMES DAILY
Qty: 180 TABLET | Refills: 1 | Status: SHIPPED | OUTPATIENT
Start: 2022-04-28 | End: 2023-06-14

## 2022-04-28 ASSESSMENT — PAIN SCALES - GENERAL: PAINLEVEL: NO PAIN (0)

## 2022-04-28 NOTE — LETTER
4/28/2022       RE: Diamante Thompson  [address]  [facility] [address]     Dear Colleague,    Thank you for referring your patient, Diamante Thompson, to the [facility]ERMATOLOGY [facility]. Please see a copy of my visit note below.    [facility] Dermatology Note  Encounter Date: Apr 28, 2022  Office Visit     Dermatology Problem List:  1. Primary focal hyperhidrosis- hand foot predominant  - iontophoresis, drysol, glycopyrrolate 1 mg BID    ____________________________________________    Assessment & Plan:   # Primary focal hyperhidrosis- hand foot predominant  > 6 mo duration of daily, symmetric visible sweating w/ cessation at night and significant impact on daily activities consistent with diagnosis of primary focal hyperhidrosis. Hand foot predominant; limited ability to nurse infant.  Discussed natural history of disease and treatment options including topical antiperspirant agents, oral anticholinergics, iontophoresis and botulinum toxin injection. Discussed adverse anticholinergic effects w/ glycopyrrolate - dry mouth eye, urinary retention.  - Will trial coverage of Connelly iontophoresis - otherwise dermadry iontophoresis  - START glycopyrrolate 1 mg BID w/ plan to uptitrate to 2 mg BID as tolerates  - Continue drysol solution as tolerated  - Consider botox vs other at follow-up pending response      Procedures Performed:   None    Follow-up: 2mo    Staff and Resident:     This patient was staffed with Dr. Servin.    Flaquito Cee MD  Internal Medicine - Dermatology PGY 4    Patient was seen and examined with the medicine/dermatology resident. I agree with the history, review of systems, physical examination, assessments and plan.    Jocy Servin MD  Professor and  Chair  Department of Dermatology  [facility]  ____________________________________________    CC: Derm Problem  "(Diamante is here today for hyperhidrosis. She states \" I have excessive sweating on my hands. I have tried drysol witch did not help\")    HPI:  Ms. Diamante Thompson is a(n) 26 year old female who presents today as a new patient for palm/soles hyperhidrosis.  - Present for entire life  - Cessation at night  - FH w/ siblings having similar  - Significant psychosocial impact  - Was unable to nurse infant due to degree of sweating  - Starting job and very distressed with shaking hands and visible sweating  - Patient is otherwise feeling well, without additional skin concerns.    Labs Reviewed:  TSH - 4.4  -- recheck, A1c and glucose pending    Physical Exam:  GEN: Pleasant female, in NAD  SKIN: Focused examination of hands was performed.  - visible glistening sweat and wet towel   - no other lesions of concern on areas examined.     Medications:  Current Outpatient Medications   Medication     emollient (VANICREAM) external cream     folic acid (FOLVITE) 1 MG tablet     ibuprofen (ADVIL/MOTRIN) 800 MG tablet     Prenatal Vit-Fe Fumarate-FA (PRENATAL MULTIVITAMIN W/IRON) 27-0.8 MG tablet     senna-docusate (SENOKOT-S/PERICOLACE) 8.6-50 MG tablet     sulfacetamide sodium-sulfur 10-5 % LOTN     No current facility-administered medications for this visit.      Past Medical History:   Patient Active Problem List   Diagnosis     Encounter for triage in pregnant patient     Need for Tdap vaccination     Labor and delivery, indication for care      (normal spontaneous vaginal delivery)     ESBL (extended spectrum beta-lactamase) producing bacteria infection     No past medical history on file.    CC Francesco Aceves, SARA CN  [address] [facility] [address]  [place] [address] on close of this encounter.      "

## 2022-04-28 NOTE — PROGRESS NOTES
"[facility] Dermatology Note  Encounter Date: Apr 28, 2022  Office Visit     Dermatology Problem List:  1. Primary focal hyperhidrosis- hand foot predominant  - iontophoresis, drysol, glycopyrrolate 1 mg BID    ____________________________________________    Assessment & Plan:   # Primary focal hyperhidrosis- hand foot predominant  > 6 mo duration of daily, symmetric visible sweating w/ cessation at night and significant impact on daily activities consistent with diagnosis of primary focal hyperhidrosis. Hand foot predominant; limited ability to nurse infant.  Discussed natural history of disease and treatment options including topical antiperspirant agents, oral anticholinergics, iontophoresis and botulinum toxin injection. Discussed adverse anticholinergic effects w/ glycopyrrolate - dry mouth eye, urinary retention.  - Will trial coverage of Connelly iontophoresis - otherwise dermadry iontophoresis  - START glycopyrrolate 1 mg BID w/ plan to uptitrate to 2 mg BID as tolerates  - Continue drysol solution as tolerated  - Consider botox vs other at follow-up pending response      Procedures Performed:   None    Follow-up: 2mo    Staff and Resident:     This patient was staffed with Dr. Servin.    Flaquito Cee MD  Internal Medicine - Dermatology PGY 4    Patient was seen and examined with the medicine/dermatology resident. I agree with the history, review of systems, physical examination, assessments and plan.    Jocy Servin MD  Professor and  Chair  Department of Dermatology  [facility]  ____________________________________________    CC: Derm Problem (Diamante is here today for hyperhidrosis. She states \" I have excessive sweating on my hands. I have tried drysol witch did not help\")    HPI:  Ms. Diamante Thompson is a(n) 26 year old female who presents today as a new patient for palm/soles hyperhidrosis.  - Present for entire life  - Cessation at night  - FH w/ siblings having " similar  - Significant psychosocial impact  - Was unable to nurse infant due to degree of sweating  - Starting job and very distressed with shaking hands and visible sweating  - Patient is otherwise feeling well, without additional skin concerns.    Labs Reviewed:  TSH - 4.4  -- recheck, A1c and glucose pending    Physical Exam:  GEN: Pleasant female, in NAD  SKIN: Focused examination of hands was performed.  - visible glistening sweat and wet towel   - no other lesions of concern on areas examined.     Medications:  Current Outpatient Medications   Medication     emollient (VANICREAM) external cream     folic acid (FOLVITE) 1 MG tablet     ibuprofen (ADVIL/MOTRIN) 800 MG tablet     Prenatal Vit-Fe Fumarate-FA (PRENATAL MULTIVITAMIN W/IRON) 27-0.8 MG tablet     senna-docusate (SENOKOT-S/PERICOLACE) 8.6-50 MG tablet     sulfacetamide sodium-sulfur 10-5 % LOTN     No current facility-administered medications for this visit.      Past Medical History:   Patient Active Problem List   Diagnosis     Encounter for triage in pregnant patient     Need for Tdap vaccination     Labor and delivery, indication for care      (normal spontaneous vaginal delivery)     ESBL (extended spectrum beta-lactamase) producing bacteria infection     No past medical history on file.    CC Francesco Aceves, SARA CNM  [address] [facility] [address]  [place] [address] on close of this encounter.

## 2022-04-28 NOTE — NURSING NOTE
"Dermatology Rooming Note    Diamante Thompson's goals for this visit include:   Chief Complaint   Patient presents with     Derm Problem     Diamante is here today for hyperhidrosis. She states \" I have excessive sweating on my hands. I have tried drysol witch did not help\"     Jocy Zabala, RMA  "

## 2022-05-06 ENCOUNTER — TELEPHONE (OUTPATIENT)
Dept: DERMATOLOGY | Facility: CLINIC | Age: 27
End: 2022-05-06
Payer: COMMERCIAL

## 2022-05-06 NOTE — TELEPHONE ENCOUNTER
[facility] Call Center    Phone Message    May a detailed message be left on voicemail: yes     Reason for Call: Other: Pt called and would like a call back to discuss switching company for a machine she uses for her hyperhidrosis. Please call her back. Thanks     Action Taken: Message routed to:  [facility] DERM    Travel Screening: Not Applicable

## 2022-05-19 ENCOUNTER — PATIENT OUTREACH (OUTPATIENT)
Dept: DERMATOLOGY | Facility: CLINIC | Age: 27
End: 2022-05-19
Payer: COMMERCIAL

## 2022-05-19 NOTE — CONFIDENTIAL NOTE
Attempted to reach patient to schedule follow up in the Dermatology Clinic.  No answer,  LM on VM to call office and Letter mailed.    Schedule with Dr. Cee or Gareth.

## 2022-05-19 NOTE — LETTER
May 19, 2022      Diamante Thompson  [address]  [place] [facility] [address]        Dear Diamante Thompson:    We recently reviewed your medical records from [facility] and found that you are due for an appointment with Dr. Jocy Servin or Dr. Cee.  Our office has attempted to reach you via telephone and left a message.    At your earliest convenience, please call the clinic at [phone] to arrange the recommended exam and possible testing.  Please kindly mention this letter when calling so that your appointment(s) can be accurately scheduled.      Sincerely,       The Clinic Staff        Medical Record Number:  [id number]

## 2022-06-24 ENCOUNTER — OFFICE VISIT (OUTPATIENT)
Dept: FAMILY MEDICINE | Facility: CLINIC | Age: 27
End: 2022-06-24
Payer: COMMERCIAL

## 2022-06-24 ENCOUNTER — LAB (OUTPATIENT)
Dept: LAB | Facility: CLINIC | Age: 27
End: 2022-06-24

## 2022-06-24 VITALS
OXYGEN SATURATION: 99 % | BODY MASS INDEX: 35.79 KG/M2 | TEMPERATURE: 98 F | HEIGHT: 62 IN | HEART RATE: 82 BPM | SYSTOLIC BLOOD PRESSURE: 109 MMHG | DIASTOLIC BLOOD PRESSURE: 71 MMHG | WEIGHT: 194.5 LBS

## 2022-06-24 DIAGNOSIS — Z11.1 SCREENING EXAMINATION FOR PULMONARY TUBERCULOSIS: Primary | ICD-10-CM

## 2022-06-24 DIAGNOSIS — Z23 COVID-19 VACCINE SERIES STARTED: ICD-10-CM

## 2022-06-24 DIAGNOSIS — Z11.1 SCREENING EXAMINATION FOR PULMONARY TUBERCULOSIS: ICD-10-CM

## 2022-06-24 PROCEDURE — 86481 TB AG RESPONSE T-CELL SUSP: CPT | Mod: 90 | Performed by: PATHOLOGY

## 2022-06-24 PROCEDURE — 36415 COLL VENOUS BLD VENIPUNCTURE: CPT | Performed by: PATHOLOGY

## 2022-06-24 PROCEDURE — 99213 OFFICE O/P EST LOW 20 MIN: CPT | Mod: 25 | Performed by: NURSE PRACTITIONER

## 2022-06-24 PROCEDURE — 99000 SPECIMEN HANDLING OFFICE-LAB: CPT | Performed by: PATHOLOGY

## 2022-06-24 PROCEDURE — 91301 COVID-19,PF,MODERNA (18+ YRS PRIMARY SERIES .5ML): CPT | Performed by: NURSE PRACTITIONER

## 2022-06-24 PROCEDURE — 0011A COVID-19,PF,MODERNA (18+ YRS PRIMARY SERIES .5ML): CPT | Performed by: NURSE PRACTITIONER

## 2022-06-24 RX ORDER — ACETAMINOPHEN 500 MG
500 TABLET ORAL
COMMUNITY

## 2022-06-24 RX ORDER — VITAMIN A ACETATE, BETA CAROTENE, ASCORBIC ACID, CHOLECALCIFEROL, .ALPHA.-TOCOPHEROL ACETATE, DL-, THIAMINE MONONITRATE, RIBOFLAVIN, NIACINAMIDE, PYRIDOXINE HYDROCHLORIDE, FOLIC ACID, CYANOCOBALAMIN, CALCIUM CARBONATE, FERROUS FUMARATE, ZINC OXIDE, CUPRIC OXIDE 3080; 12; 120; 400; 1; 1.84; 3; 20; 22; 920; 25; 200; 27; 10; 2 [IU]/1; UG/1; MG/1; [IU]/1; MG/1; MG/1; MG/1; MG/1; MG/1; [IU]/1; MG/1; MG/1; MG/1; MG/1; MG/1
1 TABLET, FILM COATED ORAL
COMMUNITY
Start: 2019-01-02 | End: 2023-06-14

## 2022-06-24 RX ORDER — CHLORAL HYDRATE 500 MG
CAPSULE ORAL
COMMUNITY
Start: 2019-01-02 | End: 2023-06-14

## 2022-06-24 ASSESSMENT — ANXIETY QUESTIONNAIRES
5. BEING SO RESTLESS THAT IT IS HARD TO SIT STILL: NOT AT ALL
7. FEELING AFRAID AS IF SOMETHING AWFUL MIGHT HAPPEN: NOT AT ALL
GAD7 TOTAL SCORE: 0
7. FEELING AFRAID AS IF SOMETHING AWFUL MIGHT HAPPEN: NOT AT ALL
3. WORRYING TOO MUCH ABOUT DIFFERENT THINGS: NOT AT ALL
2. NOT BEING ABLE TO STOP OR CONTROL WORRYING: NOT AT ALL
1. FEELING NERVOUS, ANXIOUS, OR ON EDGE: NOT AT ALL
GAD7 TOTAL SCORE: 0
4. TROUBLE RELAXING: NOT AT ALL
8. IF YOU CHECKED OFF ANY PROBLEMS, HOW DIFFICULT HAVE THESE MADE IT FOR YOU TO DO YOUR WORK, TAKE CARE OF THINGS AT HOME, OR GET ALONG WITH OTHER PEOPLE?: NOT DIFFICULT AT ALL
6. BECOMING EASILY ANNOYED OR IRRITABLE: NOT AT ALL
GAD7 TOTAL SCORE: 0

## 2022-06-25 LAB
GAMMA INTERFERON BACKGROUND BLD IA-ACNC: 0.01 IU/ML
M TB IFN-G BLD-IMP: NEGATIVE
M TB IFN-G CD4+ BCKGRND COR BLD-ACNC: 9.99 IU/ML
MITOGEN IGNF BCKGRD COR BLD-ACNC: 0 IU/ML
MITOGEN IGNF BCKGRD COR BLD-ACNC: 0.01 IU/ML
QUANTIFERON MITOGEN: 10 IU/ML
QUANTIFERON NIL TUBE: 0.01 IU/ML
QUANTIFERON TB1 TUBE: 0.01 IU/ML
QUANTIFERON TB2 TUBE: 0.02

## 2022-08-20 ENCOUNTER — HEALTH MAINTENANCE LETTER (OUTPATIENT)
Age: 27
End: 2022-08-20

## 2022-10-04 ENCOUNTER — TELEPHONE (OUTPATIENT)
Dept: PSYCHIATRY | Facility: CLINIC | Age: 27
End: 2022-10-04

## 2022-10-04 NOTE — TELEPHONE ENCOUNTER
PSYCHIATRY CLINIC PHONE INTAKE     SERVICES REQUESTED / INTERESTED IN          Individual Psychotherapy     Presenting Problem and Brief History                              What would you like to be seen for? (brief description):  Pt requested to meet Shelby Bearden (found on [facility] website), looking for guidance and helpful skills. Pt struggles with sleep, its hard to stay asleep. Not taking any meds at this time.     Have you received a mental health diagnosis? No   Which one (s): NA  Is there any history of developmental delay?  No   Are you currently seeing a mental health provider?  No            Who / month last seen:  NA  Do you have mental health records elsewhere?  No  Will you sign a release so we can obtain them?  No    Have you ever been hospitalized for psychiatric reasons?  No  Describe:  NA    Do you have current thoughts of self-harm?  No    Do you currently have thoughts of harming others?  No       Substance Use History     Do you have any history of alcohol / illicit drug use?  No  Describe:  NA  Have you ever received treatment for this?  No    Describe:  NA     Social History     Who is the patient's a guardian?  No    Name / number: NA  Have you had an ACT team in last 12 months?  No  Describe: NA   OK to leave a detailed voicemail?  Yes    Would you be interested in learning more about research opportunities for which you or your child may qualify? We can connect you with a team member for more information.  Yes  If yes, send an inbasket message to Elba Potter    Medical/ Surgical History                                   Patient Active Problem List   Diagnosis     Encounter for triage in pregnant patient     Need for Tdap vaccination     Labor and delivery, indication for care      (normal spontaneous vaginal delivery)     ESBL (extended spectrum beta-lactamase) producing bacteria infection          Medications             Current Outpatient Medications   Medication Sig Dispense Refill      acetaminophen (TYLENOL) 500 MG tablet Take 500 mg by mouth       emollient (VANICREAM) external cream Apply topically 2 times daily Entire body (Patient not taking: Reported on 6/24/2022) 453 g 5     fish oil-omega-3 fatty acids 1000 MG capsule        folic acid (FOLVITE) 1 MG tablet Take 1 mg by mouth daily (Patient not taking: No sig reported)       glycopyrrolate (ROBINUL) 1 MG tablet Take 1 tablet (1 mg) by mouth 2 times daily (Patient not taking: Reported on 6/24/2022) 180 tablet 1     Prenatal Vit-Fe Fumarate-FA (PRENATAL PLUS) 27-1 MG TABS Take 1 tablet by mouth           DISPOSITION      10/4/22 Intake complete. Sending to Shelby Bearden to approve for scheduling.     Lissa Garcia Sr. - Lead

## 2022-10-13 ENCOUNTER — VIRTUAL VISIT (OUTPATIENT)
Dept: PSYCHIATRY | Facility: CLINIC | Age: 27
End: 2022-10-13
Attending: MARRIAGE & FAMILY THERAPIST
Payer: COMMERCIAL

## 2022-10-13 DIAGNOSIS — F43.21 ADJUSTMENT DISORDER WITH DEPRESSED MOOD: Primary | ICD-10-CM

## 2022-10-13 PROCEDURE — 90791 PSYCH DIAGNOSTIC EVALUATION: CPT | Mod: GT | Performed by: MARRIAGE & FAMILY THERAPIST

## 2022-10-13 ASSESSMENT — ANXIETY QUESTIONNAIRES
4. TROUBLE RELAXING: SEVERAL DAYS
3. WORRYING TOO MUCH ABOUT DIFFERENT THINGS: SEVERAL DAYS
1. FEELING NERVOUS, ANXIOUS, OR ON EDGE: MORE THAN HALF THE DAYS
GAD7 TOTAL SCORE: 7
GAD7 TOTAL SCORE: 7
6. BECOMING EASILY ANNOYED OR IRRITABLE: SEVERAL DAYS
5. BEING SO RESTLESS THAT IT IS HARD TO SIT STILL: NOT AT ALL
IF YOU CHECKED OFF ANY PROBLEMS ON THIS QUESTIONNAIRE, HOW DIFFICULT HAVE THESE PROBLEMS MADE IT FOR YOU TO DO YOUR WORK, TAKE CARE OF THINGS AT HOME, OR GET ALONG WITH OTHER PEOPLE: SOMEWHAT DIFFICULT
8. IF YOU CHECKED OFF ANY PROBLEMS, HOW DIFFICULT HAVE THESE MADE IT FOR YOU TO DO YOUR WORK, TAKE CARE OF THINGS AT HOME, OR GET ALONG WITH OTHER PEOPLE?: SOMEWHAT DIFFICULT
7. FEELING AFRAID AS IF SOMETHING AWFUL MIGHT HAPPEN: SEVERAL DAYS
GAD7 TOTAL SCORE: 7
7. FEELING AFRAID AS IF SOMETHING AWFUL MIGHT HAPPEN: SEVERAL DAYS
2. NOT BEING ABLE TO STOP OR CONTROL WORRYING: SEVERAL DAYS
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
## 2022-10-26 ENCOUNTER — VIRTUAL VISIT (OUTPATIENT)
Dept: PSYCHIATRY | Facility: CLINIC | Age: 27
End: 2022-10-26
Attending: MARRIAGE & FAMILY THERAPIST
Payer: COMMERCIAL

## 2022-10-26 DIAGNOSIS — F43.21 ADJUSTMENT DISORDER WITH DEPRESSED MOOD: Primary | ICD-10-CM

## 2022-10-26 DIAGNOSIS — Z63.0 MARITAL OR PARTNER RELATIONAL PROBLEM: ICD-10-CM

## 2022-10-26 PROCEDURE — 90834 PSYTX W PT 45 MINUTES: CPT | Mod: GT | Performed by: MARRIAGE & FAMILY THERAPIST

## 2022-10-26 SDOH — SOCIAL STABILITY - SOCIAL INSECURITY: PROBLEMS IN RELATIONSHIP WITH SPOUSE OR PARTNER: Z63.0

## 2022-10-26 NOTE — CONFIDENTIAL NOTE
"Department of Psychiatry  Individual Psychotherapy Progress Note    Date of Service: 10/26/2022   Date of Last Visit: 10/13/2022  Care Provider: BJ Lagunas    VIDEO VISIT  Diamante Thompson is a 27 year old who is being evaluated via a billable video visit.      Telehealth Details  Type of service:  psychotherapy  Time of service:    Start Time:  11:15     End Time:  12:00    Reason for Telehealth Visit: Patient has requested telehealth visit  Originating Site (patient location):  [facility]   Location- Patient's home  Distant Site (provider location):  Off-site  Mode of Communication:  [email or web address]    SUBJECTIVE & OBJECTIVE:     Patient was seen today for a 45 minute individual psychotherapy session.  Patient was on time to this appointment    Patient did not report any changes to medications    Mental Status Exam:  Appearance:  Casually dressed and Adequately groomed  Behavior/relationship to examiner/demeanor:  Cooperative, Engaged and Pleasant  Speech rate:  Normal  Speech volume:  Normal  Speech coherence:  Normal  Speech spontaneity:  Normal  Mood (subjective report):  \"good\", pleasant and overwhelmed  Affect (objective appearance):  Appropriate/mood-congruent  Thought Process (Associations):  Logical, Linear and Goal directed  Thought process (Rate):  Normal  Abnormal Perception:  None  Attention/Concentration:  Normal  Insight:  Good  Judgment:  Good    Patient Report since last meeting/How does the patient think they are doing?: \"I'm good!  I'm doing pretty well\"    Diamante discussed current stressors, noting that her daughter is being assessed for Autism Spectrum Disorder, which has required many appointments and causes her stress.  She explored various ways that she manages stress and named her marriage as her primary stressor.  Diamante also processed ways that she and her  learned to be a \"good spouse\" as well as aspects of a \"good marriage\".  SHe showed insight in recognizing her own fear of " uncertainty.    Patient denied SI, plan and/or means.    ASSESSMENT:    Patient is a 27 year old year old Female presenting for a follow-up visit due to symptoms of:   1. Adjustment disorder with depressed mood    2. Marital or partner relational problem        Progress toward short-term goals: Patient presents in a timely and engaged manner. She continues to struggle with stress management and relational problems. Continued 1:1 therapy will be helpful in addressing her treatment goals through teaching the relationship between language, thoughts, feelings, and behaviors, and how these impact her symptoms.    Patient was able to participate and benefit from treatment as evidenced by her verbal expression and understanding of ideas discussed.  Patient continues to be motivated for treatment.    PLAN:    Follow-up: Continue 1:1 individual therapy using a blend of narrative, solution-focused, and CBT modalities. Patient was not assigned homework to complete for the following session.     Review of long term goals: The patient s treatment plan will be created in upcoming session.    Performed and documented by: BJ Lagunas

## 2022-11-02 ENCOUNTER — VIRTUAL VISIT (OUTPATIENT)
Dept: PSYCHIATRY | Facility: CLINIC | Age: 27
End: 2022-11-02
Attending: MARRIAGE & FAMILY THERAPIST
Payer: COMMERCIAL

## 2022-11-02 ENCOUNTER — BEH TREATMENT PLAN (OUTPATIENT)
Dept: PSYCHIATRY | Facility: CLINIC | Age: 27
End: 2022-11-02

## 2022-11-02 DIAGNOSIS — Z63.0 MARITAL OR PARTNER RELATIONAL PROBLEM: ICD-10-CM

## 2022-11-02 DIAGNOSIS — F43.21 ADJUSTMENT DISORDER WITH DEPRESSED MOOD: Primary | ICD-10-CM

## 2022-11-02 PROCEDURE — 90837 PSYTX W PT 60 MINUTES: CPT | Mod: GT | Performed by: MARRIAGE & FAMILY THERAPIST

## 2022-11-02 SDOH — SOCIAL STABILITY - SOCIAL INSECURITY: PROBLEMS IN RELATIONSHIP WITH SPOUSE OR PARTNER: Z63.0

## 2022-11-02 NOTE — PROGRESS NOTES
Outpatient Treatment Plan Summary  [facility]    Date of Initial Service: 10/13/22  Date of INTIAL Treatment Plan: 11/2/22  Last Review/Update Date:  n/a   90-Day Review Date: 2/2/23       Treatment plan was initiated and completed at this visit.    DSM-V DIAGNOSIS:   Adjustment Disorders  309.0 (F43.21) With depressed mood   Marital conflict    CURRENT SYMPTOMS and circumstances that substantiate the diagnosis:   Patient reports feelings of sadness and disappointment related to her marriage, as well as stress and shame associated with the cultural stigma in her culture.      How symptoms and/or behaviors are affecting level of functioning:   Impedes her ability/desire to communicate effectively with others, avoidance coping    RISK ASSESSMENT   SUICIDALITY:   Assessed Level of Immediate Risk: None   Ideation: No,   Plan: No,   Means: No,   Intent: No    HOMICIDE/VIOLENCE:   Assessed Level of Immediate Risk: None,   Ideation: No,   Plan: No,   Means: No,   Intent: No      TREATMENT  PLAN:          SYMPTOMS; PROBLEMS   MEASURABLE GOALS;    FUNCTIONAL IMPROVEMENT INTERVENTIONS + WHO WILL PROVIDE;   GAINS MADE DISCHARGE CRITERIA   Psychosocial: relationship stress    1. Improve communication skills  2. Practice conflict resolution  3. Learn role of boundaries and emotion regulation   Blend of Narrative, CBT, and EMDR modalities    Individual Therapy provided by Shelby Bearden significant improvement in self-reports for 12 consecutive visits   Depression: anhedonia   1. report feeling more positive about self ,  2. develop 2 strategies to cope with trauma triggers and   3. Establish self-care routine Blend of Narrative, CBT, and EMDR modalities    Individual Therapy provided by Shelby Bearden significant improvement in self-reports for 12 consecutive visits             1. Frequency of Sessions:  weekly    2. Discharge and Aftercare Goals: promoting return to functioning in emotional regulation, thought process  and social relationships.      3. Expected duration of treatment:  6-9 months        See encounter dated 11/2/22 with Shelby Bearden  for acknowledged consent of current treatment plan      Regulatory Guidelines for Updating Treatment Plan  [place] Medical Assistance: Reviewed & signed at least every 90days  Medicare:  Update per policy

## 2022-11-02 NOTE — CONFIDENTIAL NOTE
"Department of Psychiatry  Individual Psychotherapy Progress Note    Date of Service: 11/2/2022   Date of Last Visit: 10/26/2022  Care Provider: JB Lagunas    VIDEO VISIT  Diamante Thompson is a 27 year old who is being evaluated via a billable video visit.      Telehealth Details  Type of service:  psychotherapy  Time of service:    Start Time:  11:00    End Time:  12:00    Reason for Telehealth Visit: Patient has requested telehealth visit  Originating Site (patient location):  [facility]   Location- Patient's home  Distant Site (provider location):  Off-site  Mode of Communication:  [email or web address]    SUBJECTIVE & OBJECTIVE:     Patient was seen today for a 60 minute individual psychotherapy session.  Patient was on time to this appointment    Patient did not report any changes to medications    Mental Status Exam:  Appearance:  Casually dressed and Adequately groomed  Behavior/relationship to examiner/demeanor:  Cooperative, Engaged and Pleasant  Speech rate:  Normal  Speech volume:  Normal  Speech coherence:  Normal  Speech spontaneity:  Normal  Mood (subjective report):  \"good\", pleasant and overwhelmed  Affect (objective appearance):  Appropriate/mood-congruent  Thought Process (Associations):  Logical, Linear and Goal directed  Thought process (Rate):  Normal  Abnormal Perception:  None  Attention/Concentration:  Normal  Insight:  Good  Judgment:  Good    Patient Report since last meeting/How does the patient think they are doing?: \"I had a good week - not too bad.\"    Diamante recounted events of the week, noting that she had a stressful interaction with her sister and that her  was \"being extra nice\", which was confusing to her.  She explored feelings of guilt and feeling responsible for others' emotions.  Diamante discussed difficult in being vulnerable and showing her true emotions, as well as in asking for help.   Treatment plan was completed in session.    Patient denied SI, plan and/or " means.    ASSESSMENT:    Patient is a 27 year old year old Female presenting for a follow-up visit due to symptoms of:   1. Adjustment disorder with depressed mood    2. Marital or partner relational problem        Progress toward short-term goals: Patient presents in a timely and engaged manner. She continues to struggle with stress management and relational problems. Continued 1:1 therapy will be helpful in addressing her treatment goals through teaching the relationship between language, thoughts, feelings, and behaviors, and how these impact her symptoms.    Patient was able to participate and benefit from treatment as evidenced by her verbal expression and understanding of ideas discussed.  Patient continues to be motivated for treatment.    PLAN:    Follow-up: Continue 1:1 individual therapy using a blend of narrative, solution-focused, and CBT modalities. Patient was not assigned homework to complete for the following session.     Review of long term goals: The patient s treatment plan was completed in todays session    Performed and documented by: BJ Lagunas

## 2022-11-17 ENCOUNTER — VIRTUAL VISIT (OUTPATIENT)
Dept: PSYCHIATRY | Facility: CLINIC | Age: 27
End: 2022-11-17
Attending: MARRIAGE & FAMILY THERAPIST
Payer: COMMERCIAL

## 2022-11-17 DIAGNOSIS — F43.21 ADJUSTMENT DISORDER WITH DEPRESSED MOOD: Primary | ICD-10-CM

## 2022-11-17 DIAGNOSIS — Z63.0 MARITAL OR PARTNER RELATIONAL PROBLEM: ICD-10-CM

## 2022-11-17 PROCEDURE — 90837 PSYTX W PT 60 MINUTES: CPT | Mod: GT | Performed by: MARRIAGE & FAMILY THERAPIST

## 2022-11-17 SDOH — SOCIAL STABILITY - SOCIAL INSECURITY: PROBLEMS IN RELATIONSHIP WITH SPOUSE OR PARTNER: Z63.0

## 2022-11-20 ENCOUNTER — HEALTH MAINTENANCE LETTER (OUTPATIENT)
Age: 27
End: 2022-11-20

## 2022-11-22 NOTE — CONFIDENTIAL NOTE
"Department of Psychiatry  Individual Psychotherapy Progress Note    Date of Service: 11/17/2022   Date of Last Visit: 11/2/2022  Care Provider: BJ Lagunas    VIDEO VISIT  Diamante Thompson is a 27 year old who is being evaluated via a billable video visit.      Telehealth Details  Type of service:  psychotherapy  Time of service:    Start Time:  1:00    End Time:  2:00    Reason for Telehealth Visit: Patient has requested telehealth visit  Originating Site (patient location):  [facility]   Location- Patient's home  Distant Site (provider location):  Off-site  Mode of Communication:  [email or web address]    SUBJECTIVE & OBJECTIVE:     Patient was seen today for a 60 minute individual psychotherapy session.  Patient was on time to this appointment    Patient did not report any changes to medications    Mental Status Exam:  Appearance:  Casually dressed and Appropriately groomed  Behavior/relationship to examiner/demeanor:  Cooperative, Engaged and Pleasant  Speech rate:  Normal  Speech volume:  Normal  Speech coherence:  Normal  Speech spontaneity:  Normal  Mood (subjective report):  \"good\", relieved, overwhelmed, and pleasant  Affect (objective appearance):  Appropriate/mood-congruent  Thought Process (Associations):  Logical, Linear and Goal directed  Thought process (Rate):  Normal  Abnormal Perception:  None  Attention/Concentration:  Normal  Insight:  Good  Judgment:  Good    Patient Report since last meeting/How does the patient think they are doing?: \"I'm good, I guess - I've been doing well with mental health stuff\"    Diamante recounted events of the week, reporting that her  returned to [place] and she was feeling guilty for not having feelings for him any more.  She discussed her experiences and feelings when she learned that he has been talking with other women.  She explored what it will mean to her to get  and how she might communicate it to family and friends.    Patient denied SI, plan and/or " means.    ASSESSMENT:    Patient is a 27 year old year old Female presenting for a follow-up visit due to symptoms of:   1. Adjustment disorder with depressed mood    2. Marital or partner relational problem        Progress toward short-term goals: Patient presents in a timely and engaged manner. She continues to struggle with stress management and relational problems. Continued 1:1 therapy will be helpful in addressing her treatment goals through teaching the relationship between language, thoughts, feelings, and behaviors, and how these impact her symptoms.    Patient was able to participate and benefit from treatment as evidenced by her verbal expression and understanding of ideas discussed.  Patient continues to be motivated for treatment.    PLAN:    Follow-up: Continue 1:1 individual therapy using a blend of narrative, solution-focused, and CBT modalities. Patient was not assigned homework to complete for the following session.     Review of long term goals: The patient s treatment plan was completed was completed 11/2/22 and will be reviewed in February 2023.    Performed and documented by: BJ Lagunas

## 2022-12-08 ENCOUNTER — VIRTUAL VISIT (OUTPATIENT)
Dept: PSYCHIATRY | Facility: CLINIC | Age: 27
End: 2022-12-08
Attending: MARRIAGE & FAMILY THERAPIST
Payer: COMMERCIAL

## 2022-12-08 DIAGNOSIS — F43.21 ADJUSTMENT DISORDER WITH DEPRESSED MOOD: Primary | ICD-10-CM

## 2022-12-08 DIAGNOSIS — Z63.0 MARITAL OR PARTNER RELATIONAL PROBLEM: ICD-10-CM

## 2022-12-08 PROCEDURE — 90837 PSYTX W PT 60 MINUTES: CPT | Mod: GT | Performed by: MARRIAGE & FAMILY THERAPIST

## 2022-12-08 SDOH — SOCIAL STABILITY - SOCIAL INSECURITY: PROBLEMS IN RELATIONSHIP WITH SPOUSE OR PARTNER: Z63.0

## 2022-12-13 NOTE — CONFIDENTIAL NOTE
"Department of Psychiatry  Individual Psychotherapy Progress Note    Date of Service: 12/8/2022   Date of Last Visit: 11/17/2022  Care Provider: BJ Lagunas    VIDEO VISIT  Diamante Thompson is a 27 year old who is being evaluated via a billable video visit.      Telehealth Details  Type of service:  psychotherapy  Time of service:    Start Time:  12:05    End Time:  1:00    Reason for Telehealth Visit: Patient has requested telehealth visit  Originating Site (patient location):  [facility]   Location- Patient's home  Distant Site (provider location):  Off-site  Mode of Communication:  [email or web address]    SUBJECTIVE & OBJECTIVE:     Patient was seen today for a 55 minute individual psychotherapy session.  Patient was on time to this appointment    Patient did not report any changes to medications    Mental Status Exam:  Appearance:  Casually dressed and Appropriately groomed  Behavior/relationship to examiner/demeanor:  Cooperative, Engaged and Pleasant  Speech rate:  Normal  Speech volume:  Normal  Speech coherence:  Normal  Speech spontaneity:  Normal  Mood (subjective report):  \"good\", overwhelmed, frustrated, optimistic, and pleasant  Affect (objective appearance):  Appropriate/mood-congruent  Thought Process (Associations):  Logical, Linear and Goal directed  Thought process (Rate):  Normal  Abnormal Perception:  None  Attention/Concentration:  Normal  Insight:  Good  Judgment:  Good    Patient Report since last meeting/How does the patient think they are doing?: \"I'm good; there's been a lot of ups and downs lately\"    Diamante recounted events since last session, expressing feelings of empowerment and resentment in managing her marriage an impending divorce due to her 's infidelity.  She explored the topic of values and examined her own values during times when she's felt fulfilled versus unfulfilled.  She considered ways she lives her values currently and ways her behaviors are incongruent with her " values.    ASSESSMENT:    Patient is a 27 year old year old Female presenting for a follow-up visit due to symptoms of:   1. Adjustment disorder with depressed mood    2. Marital or partner relational problem        Progress toward short-term goals: Patient presents in a timely and engaged manner. She continues to struggle with stress management and relational problems. Continued 1:1 therapy will be helpful in addressing her treatment goals through teaching the relationship between language, thoughts, feelings, and behaviors, and how these impact her symptoms.    Patient was able to participate and benefit from treatment as evidenced by her verbal expression and understanding of ideas discussed.  Patient continues to be motivated for treatment.    PLAN:    Follow-up: Continue 1:1 individual therapy using a blend of narrative, solution-focused, and CBT modalities. Patient was not assigned homework to complete for the following session.     Review of long term goals: The patient s treatment plan was completed was completed 11/2/22 and will be reviewed in February 2023.    Performed and documented by: BJ Lagunas

## 2023-06-14 ENCOUNTER — OFFICE VISIT (OUTPATIENT)
Dept: FAMILY MEDICINE | Facility: CLINIC | Age: 28
End: 2023-06-14
Payer: COMMERCIAL

## 2023-06-14 VITALS
TEMPERATURE: 98 F | OXYGEN SATURATION: 97 % | DIASTOLIC BLOOD PRESSURE: 77 MMHG | HEART RATE: 73 BPM | SYSTOLIC BLOOD PRESSURE: 114 MMHG | BODY MASS INDEX: 31.89 KG/M2 | HEIGHT: 62 IN | WEIGHT: 173.3 LBS | RESPIRATION RATE: 18 BRPM

## 2023-06-14 DIAGNOSIS — F41.9 ANXIETY AND DEPRESSION: Primary | ICD-10-CM

## 2023-06-14 DIAGNOSIS — F32.A ANXIETY AND DEPRESSION: Primary | ICD-10-CM

## 2023-06-14 DIAGNOSIS — E66.811 CLASS 1 OBESITY WITHOUT SERIOUS COMORBIDITY WITH BODY MASS INDEX (BMI) OF 32.0 TO 32.9 IN ADULT, UNSPECIFIED OBESITY TYPE: ICD-10-CM

## 2023-06-14 RX ORDER — HYDROXYZINE HYDROCHLORIDE 25 MG/1
25 TABLET, FILM COATED ORAL 3 TIMES DAILY PRN
Qty: 20 TABLET | Refills: 0 | Status: SHIPPED | OUTPATIENT
Start: 2023-06-14 | End: 2023-07-19

## 2023-06-14 ASSESSMENT — PAIN SCALES - GENERAL: PAINLEVEL: NO PAIN (0)

## 2023-06-14 ASSESSMENT — ENCOUNTER SYMPTOMS
FATIGUE: 0
CONSTIPATION: 0

## 2023-06-14 ASSESSMENT — ANXIETY QUESTIONNAIRES
IF YOU CHECKED OFF ANY PROBLEMS ON THIS QUESTIONNAIRE, HOW DIFFICULT HAVE THESE PROBLEMS MADE IT FOR YOU TO DO YOUR WORK, TAKE CARE OF THINGS AT HOME, OR GET ALONG WITH OTHER PEOPLE: VERY DIFFICULT
GAD7 TOTAL SCORE: 12
2. NOT BEING ABLE TO STOP OR CONTROL WORRYING: MORE THAN HALF THE DAYS
7. FEELING AFRAID AS IF SOMETHING AWFUL MIGHT HAPPEN: MORE THAN HALF THE DAYS
3. WORRYING TOO MUCH ABOUT DIFFERENT THINGS: MORE THAN HALF THE DAYS
6. BECOMING EASILY ANNOYED OR IRRITABLE: SEVERAL DAYS
1. FEELING NERVOUS, ANXIOUS, OR ON EDGE: MORE THAN HALF THE DAYS
5. BEING SO RESTLESS THAT IT IS HARD TO SIT STILL: SEVERAL DAYS
GAD7 TOTAL SCORE: 12

## 2023-06-14 ASSESSMENT — PATIENT HEALTH QUESTIONNAIRE - PHQ9
SUM OF ALL RESPONSES TO PHQ QUESTIONS 1-9: 12
5. POOR APPETITE OR OVEREATING: MORE THAN HALF THE DAYS

## 2023-06-14 NOTE — PROGRESS NOTES
Depression Response    Patient completed the PHQ-9 assessment for depression and scored >9? Yes  Question 9 on the PHQ-9 was positive for suicidality? No  Does patient have current mental health provider? No    Is this a virtual visit? No     I personally notified the following: visit provider

## 2023-06-14 NOTE — NURSING NOTE
"ROOM:1  JIMMY YATES    Preferred Name: Diamante     How did you hear about us?  Current Patient    27 year old  Chief Complaint   Patient presents with     Anxiety     Weight Loss       Blood pressure 114/77, pulse 73, temperature 98  F (36.7  C), temperature source Oral, resp. rate 18, height 1.567 m (5' 1.7\"), weight 78.6 kg (173 lb 4.8 oz), SpO2 97 %, not currently breastfeeding. Body mass index is 32.01 kg/m .  BP completed using cuff size:        Patient Active Problem List   Diagnosis     Encounter for triage in pregnant patient     Need for Tdap vaccination     Labor and delivery, indication for care      (normal spontaneous vaginal delivery)     ESBL (extended spectrum beta-lactamase) producing bacteria infection       Wt Readings from Last 2 Encounters:   23 78.6 kg (173 lb 4.8 oz)   22 88.2 kg (194 lb 8 oz)     BP Readings from Last 3 Encounters:   23 114/77   22 109/71   22 110/64       No Known Allergies    Current Outpatient Medications   Medication     acetaminophen (TYLENOL) 500 MG tablet     emollient (VANICREAM) external cream     fish oil-omega-3 fatty acids 1000 MG capsule     folic acid (FOLVITE) 1 MG tablet     glycopyrrolate (ROBINUL) 1 MG tablet     Prenatal Vit-Fe Fumarate-FA (PRENATAL PLUS) 27-1 MG TABS     No current facility-administered medications for this visit.       Social History     Tobacco Use     Smoking status: Never     Smokeless tobacco: Never     Tobacco comments:     patient smoke hookah   Vaping Use     Vaping status: Never Used   Substance Use Topics     Alcohol use: No     Alcohol/week: 0.0 standard drinks of alcohol     Drug use: Yes     Types: Marijuana     Comment: Used until first OB visit       Honoring Choices - Health Care Directive Guide offered to patient at time of visit.    Health Maintenance Due   Topic Date Due     YEARLY PREVENTIVE VISIT  Never done     ADVANCE CARE PLANNING  Never done     COVID-19 Vaccine (2 - Moderna " series) 08/19/2022       Immunization History   Administered Date(s) Administered     COVID-19 Monovalent 18+ (Moderna) 06/24/2022     DTAP (<7y) 11/19/1997, 06/16/1998, 10/19/1998, 04/19/1999, 08/04/2000     Dtap, 5 Pertussis Antigens (DAPTACEL) 09/16/1998     HEPA 04/22/2010, 08/24/2015     HIB (PRP-T) 11/19/1997, 09/16/1998     HIB(PRP-OMP)(PedvaxHIB) 11/19/1997, 09/16/1998     HepB 11/19/1997, 09/16/1998, 11/24/1998     Hepatitis A (ADULT 19+) 04/22/2010, 08/24/2015     Hepatitis B, Adult 11/19/1997, 09/16/1998, 11/24/1998     Hepatits B (Peds <19Y) 11/19/1997     Influenza (IIV3) PF 01/18/2001, 12/28/2001, 11/29/2002, 12/26/2003, 12/04/2006, 11/20/2007, 10/07/2010     Influenza Vaccine >6 months (Alfuria,Fluzone) 03/11/2019, 12/06/2019     MMR 11/19/1997, 09/16/1998     Meningococcal ACWY (Menactra ) 08/24/2015     OPV, monovalent, unspecified 11/19/1997, 09/16/1998, 10/19/1998, 08/04/2000     Poliovirus, inactivated (IPV) 11/19/1997, 09/16/1998, 10/19/1998     TDAP (Adacel,Boostrix) 04/22/2010     TDAP Vaccine (Adacel) 03/11/2019     Typhoid IM 04/22/2010     Varicella 08/04/2000, 08/24/2015     Yellow Fever 04/22/2010       No results found for: PAP    Recent Labs   Lab Test 04/28/22  0928 12/10/19  0000   A1C 5.2 5.1   LDL  --  84   HDL  --  49   TRIG  --  82   ALT  --  17   CR  --  0.71   GFRESTIMATED  --  >60   GFRESTBLACK  --  >60   POTASSIUM  --  3.8   TSH 2.16 4.440*           10/13/2022    11:36 AM 4/28/2022     8:52 AM   PHQ-2 ( 1999 Pfizer)   Q1: Little interest or pleasure in doing things 1 0   Q2: Feeling down, depressed or hopeless 1 0   PHQ-2 Score 2 0   Q1: Little interest or pleasure in doing things Several days    Q2: Feeling down, depressed or hopeless Several days    PHQ-2 Score 2            4/18/2019     2:51 PM 5/17/2019     2:23 PM 6/14/2023     1:42 PM   PHQ-9 SCORE   PHQ-9 Total Score 3 4 12           6/24/2022     9:22 AM 10/13/2022    11:48 AM 6/14/2023     1:42 PM   TOMMY-7 SCORE    Total Score 0 (minimal anxiety) 7 (mild anxiety)    Total Score 0 7 12            View : No data to display.                Eliza Pastrana, EMT    June 14, 2023 1:47 PM

## 2023-06-14 NOTE — PROGRESS NOTES
"Today's Date: Jun 14, 2023     Patient Diamante Thompson 1995 presents to the clinic today to address   Chief Complaint   Patient presents with     Anxiety     Weight Loss             SUBJECTIVE     History of Present Illness:      27-year-old female presents to discuss anxiety and weight loss medications.    Anxiety-patient reports increased anxiety especially when it comes to driving.  After her divorce within the last year she had to learn how to drive in order to take her 4-year-old daughter to various appointments and activities.  She feels like she is improving at driving, however, she still feels anxious even before she drives.  She endorses panic attacks (she has about 2 panic attacks per month.  When she has a panic attack she will \"shut down\" which involves turning off her cell phone and going to a quiet place to rest.)  She denies thoughts of harming herself.  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 PHQ9-12 and TOMMY-7 12 today.     Obesity-patient has a chronic longstanding history of obesity.  She has tried phentermine in the past but felt that it was ineffective.  She would like to discuss wegovy.  She has lost approximately 20 pounds over the last year, but she feels that was secondary to a norovirus infection over the winter that she recovered from ( she reported GI symptoms and \"not being able to keep anything down\" for a few days).  She denies systemic symptoms including fatigue, constipation, heat or cold intolerance, or lower leg swelling. She exercises daily, taking walks for 1-2 hours per day. She denies a personal history of thyroid cancer or pancreatitis.  She is unsure about family history of thyroid cancer as she believes her sister may have a thyroid problem (her sister is having surgery to remove her thyroid). No other acute concerns/symptoms at time of exam.        Review of Systems "   Constitutional: Negative for fatigue.   Cardiovascular: Negative for peripheral edema.   Gastrointestinal: Negative for constipation.   Endocrine: Negative for cold intolerance and heat intolerance.   Constitutional, HEENT, cardiovascular, pulmonary, gi and gu systems are negative, except as otherwise noted.      No Known Allergies   Current Outpatient Medications   Medication     acetaminophen (TYLENOL) 500 MG tablet         No current facility-administered medications for this visit.           History reviewed. No pertinent past medical history.     Family History   Problem Relation Age of Onset     Diabetes Mother         not on insulin     Diabetes Father         on insulin     Myocardial Infarction Father      Hypertension Father         Social History     Tobacco Use     Smoking status: Never     Smokeless tobacco: Never     Tobacco comments:     patient smoke hookah   Vaping Use     Vaping status: Never Used   Substance Use Topics     Alcohol use: No     Alcohol/week: 0.0 standard drinks of alcohol     Drug use: Yes     Types: Marijuana     Comment: Used until first OB visit        History   Sexual Activity     Sexual activity: Not Currently            4/18/2019     2:51 PM 5/17/2019     2:23 PM 6/14/2023     1:42 PM   PHQ   PHQ-9 Total Score 3 4 12   Q9: Thoughts of better off dead/self-harm past 2 weeks Not at all Not at all Not at all        Immunization History   Administered Date(s) Administered     COVID-19 Monovalent 18+ (Moderna) 06/24/2022     DTAP (<7y) 11/19/1997, 06/16/1998, 10/19/1998, 04/19/1999, 08/04/2000     Dtap, 5 Pertussis Antigens (DAPTACEL) 09/16/1998     HEPA 04/22/2010, 08/24/2015     HIB (PRP-T) 11/19/1997, 09/16/1998     HIB(PRP-OMP)(PedvaxHIB) 11/19/1997, 09/16/1998     HepB 11/19/1997, 09/16/1998, 11/24/1998     Hepatitis A (ADULT 19+) 04/22/2010, 08/24/2015     Hepatitis B, Adult 11/19/1997, 09/16/1998, 11/24/1998     Hepatits B (Peds <19Y) 11/19/1997     Influenza (IIV3) PF  "01/18/2001, 12/28/2001, 11/29/2002, 12/26/2003, 12/04/2006, 11/20/2007, 10/07/2010     Influenza Vaccine >6 months (Alfuria,Fluzone) 03/11/2019, 12/06/2019     MMR 11/19/1997, 09/16/1998     Meningococcal ACWY (Menactra ) 08/24/2015     OPV, monovalent, unspecified 11/19/1997, 09/16/1998, 10/19/1998, 08/04/2000     Poliovirus, inactivated (IPV) 11/19/1997, 09/16/1998, 10/19/1998     TDAP (Adacel,Boostrix) 04/22/2010     TDAP Vaccine (Adacel) 03/11/2019     Typhoid IM 04/22/2010     Varicella 08/04/2000, 08/24/2015     Yellow Fever 04/22/2010                 OBJECTIVE     /77 (BP Location: Left arm, Patient Position: Sitting, Cuff Size: Adult Regular)   Pulse 73   Temp 98  F (36.7  C) (Oral)   Resp 18   Ht 1.567 m (5' 1.7\")   Wt 78.6 kg (173 lb 4.8 oz)   SpO2 97%   BMI 32.01 kg/m       Labs:  Lab Results   Component Value Date    WBC 14.8 (H) 05/29/2019    HGB 11.5 (L) 05/30/2019    HCT 39.2 05/29/2019     05/29/2019    CHOL 149 12/10/2019    TRIG 82 12/10/2019    HDL 49 12/10/2019    ALT 17 12/10/2019    AST 17 12/10/2019    TSH 2.16 04/28/2022        Physical Exam  Constitutional:       General: She is not in acute distress.     Appearance: She is not ill-appearing.   Eyes:      Extraocular Movements: Extraocular movements intact.   Neck:      Thyroid: No thyroid tenderness.   Cardiovascular:      Rate and Rhythm: Normal rate and regular rhythm.      Heart sounds: Normal heart sounds. No murmur heard.  Pulmonary:      Effort: No respiratory distress.      Breath sounds: Normal breath sounds. No wheezing or rales.   Abdominal:      General: Bowel sounds are normal.      Palpations: Abdomen is soft.      Tenderness: There is no abdominal tenderness.   Musculoskeletal:      Cervical back: Neck supple.      Right lower leg: No edema.      Left lower leg: No edema.   Lymphadenopathy:      Cervical: No cervical adenopathy.   Neurological:      General: No focal deficit present.      Mental Status: She " is alert.   Psychiatric:         Thought Content: Thought content normal.         Judgment: Judgment normal.               ASSESSMENT/PLAN     1. Anxiety and depression  Patient's PHQ-9 is a 12 and her TOMMY-7 is a 12 as well. She denies thoughts of harming herself. We had a demond discussion regarding various preventative and abortive medications for anxiety and depression.  While there is a risk of weight gain with SSRIs, I do believe the benefits outweigh the risks in this circumstance considering her symptomatology(especially considering the patient's anxiety regarding driving has not subsided despite her subjective improvement with this task).  Patient was agreeable to try sertraline and to use hydroxyzine as needed.    Advised patient that SSRIs can take as long as 4 to 6 weeks before she can start seeing an effect. Patient was advised not to drive after taking hydroxyzine.  She should also continue seeing her therapist.  - hydrOXYzine (ATARAX) 25 MG tablet; Take 1 tablet (25 mg) by mouth 3 times daily as needed for anxiety  Dispense: 20 tablet; Refill: 0  - sertraline (ZOLOFT) 50 MG tablet; Take 0.5 tablets (25 mg) by mouth daily X 2 weeks, then increase to 1 tablet (50mg) po every day  Dispense: 60 tablet; Refill: 1    2. Class 1 obesity without serious comorbidity with body mass index (BMI) of 32.0 to 32.9 in adult, unspecified obesity type  Patient is down approximately 20 pounds over the past year.  Advised the patient that this is likely due to her current lifestyle modifications and not secondary to an uncomplicated course of norovirus.  While I appreciate the appeal of an injectable such as Wegovy, I advised the patient that she should continue her lifestyle modifications (including increasing hydration with water and avoiding snacking when bored) and have an evaluation with our weight management clinic.  She will also need to figure out if her sister has thyroid cancer and if so which type as medullary  thyroid cancer is a contraindication to GLP-1 agonist.  I would not advise the patient to start phentermine in the setting of underlying anxiety.  - Adult Comprehensive Weight Management  Referral; Future        Follow-Up:  - Follow up in 6 week(s) to discuss sertraline, or sooner if symptoms worsen or fail to improve.     Options for treatment and follow-up care were reviewed with the patient. Patient engaged in the decision making process and verbalized understanding of the options discussed and agreed with the final plan.  AVS printed and given to patient.    SARA Wen [facility]  [facility]  [address] [place] [id number] [phone]        Note: Chart documentation was done in part with Dragon Voice Recognition software.  Although reviewed after completion, some word and grammatical errors may remain. Please contact author for any clarification or concerns.

## 2023-07-19 ENCOUNTER — OFFICE VISIT (OUTPATIENT)
Dept: FAMILY MEDICINE | Facility: CLINIC | Age: 28
End: 2023-07-19
Payer: COMMERCIAL

## 2023-07-19 VITALS
HEART RATE: 71 BPM | WEIGHT: 178 LBS | TEMPERATURE: 97.6 F | BODY MASS INDEX: 31.54 KG/M2 | HEIGHT: 63 IN | DIASTOLIC BLOOD PRESSURE: 70 MMHG | OXYGEN SATURATION: 98 % | SYSTOLIC BLOOD PRESSURE: 104 MMHG

## 2023-07-19 DIAGNOSIS — F32.A ANXIETY AND DEPRESSION: ICD-10-CM

## 2023-07-19 DIAGNOSIS — F41.9 ANXIETY AND DEPRESSION: ICD-10-CM

## 2023-07-19 DIAGNOSIS — E66.811 CLASS 1 OBESITY WITHOUT SERIOUS COMORBIDITY WITH BODY MASS INDEX (BMI) OF 32.0 TO 32.9 IN ADULT, UNSPECIFIED OBESITY TYPE: Primary | ICD-10-CM

## 2023-07-19 ASSESSMENT — ANXIETY QUESTIONNAIRES
GAD7 TOTAL SCORE: 4
1. FEELING NERVOUS, ANXIOUS, OR ON EDGE: SEVERAL DAYS
7. FEELING AFRAID AS IF SOMETHING AWFUL MIGHT HAPPEN: NOT AT ALL
3. WORRYING TOO MUCH ABOUT DIFFERENT THINGS: SEVERAL DAYS
2. NOT BEING ABLE TO STOP OR CONTROL WORRYING: SEVERAL DAYS
6. BECOMING EASILY ANNOYED OR IRRITABLE: NOT AT ALL
GAD7 TOTAL SCORE: 4
IF YOU CHECKED OFF ANY PROBLEMS ON THIS QUESTIONNAIRE, HOW DIFFICULT HAVE THESE PROBLEMS MADE IT FOR YOU TO DO YOUR WORK, TAKE CARE OF THINGS AT HOME, OR GET ALONG WITH OTHER PEOPLE: NOT DIFFICULT AT ALL
5. BEING SO RESTLESS THAT IT IS HARD TO SIT STILL: NOT AT ALL

## 2023-07-19 ASSESSMENT — PATIENT HEALTH QUESTIONNAIRE - PHQ9
5. POOR APPETITE OR OVEREATING: SEVERAL DAYS
SUM OF ALL RESPONSES TO PHQ QUESTIONS 1-9: 7

## 2023-07-19 NOTE — NURSING NOTE
"ROOM:3  JIMMY YATES    Preferred Name: Diamante     How did you hear about us?  Current Patient    27 year old  Chief Complaint   Patient presents with     Weight Loss       Blood pressure 104/70, pulse 71, temperature 97.6  F (36.4  C), temperature source Oral, height 1.593 m (5' 2.7\"), weight 80.7 kg (178 lb), SpO2 98 %, not currently breastfeeding. Body mass index is 31.83 kg/m .  BP completed using cuff size:        Patient Active Problem List   Diagnosis     Encounter for triage in pregnant patient     Need for Tdap vaccination     Labor and delivery, indication for care      (normal spontaneous vaginal delivery)     ESBL (extended spectrum beta-lactamase) producing bacteria infection       Wt Readings from Last 2 Encounters:   23 80.7 kg (178 lb)   23 78.6 kg (173 lb 4.8 oz)     BP Readings from Last 3 Encounters:   23 104/70   23 114/77   22 109/71       No Known Allergies    Current Outpatient Medications   Medication     acetaminophen (TYLENOL) 500 MG tablet     hydrOXYzine (ATARAX) 25 MG tablet     sertraline (ZOLOFT) 50 MG tablet     No current facility-administered medications for this visit.       Social History     Tobacco Use     Smoking status: Never     Smokeless tobacco: Never     Tobacco comments:     patient smoke hookah   Vaping Use     Vaping Use: Never used   Substance Use Topics     Alcohol use: No     Alcohol/week: 0.0 standard drinks of alcohol     Drug use: Yes     Types: Marijuana     Comment: Used until first OB visit       Honoring Choices - Health Care Directive Guide offered to patient at time of visit.    Health Maintenance Due   Topic Date Due     YEARLY PREVENTIVE VISIT  Never done     ADVANCE CARE PLANNING  Never done     COVID-19 Vaccine (2 - Moderna series) 2022       Immunization History   Administered Date(s) Administered     COVID-19 Monovalent 18+ (Moderna) 2022     DTAP (<7y) 1997, 1998, 10/19/1998, 1999, " 08/04/2000     Dtap, 5 Pertussis Antigens (DAPTACEL) 09/16/1998     HEPA 04/22/2010, 08/24/2015     HIB (PRP-T) 11/19/1997, 09/16/1998     HIB(PRP-OMP)(PedvaxHIB) 11/19/1997, 09/16/1998     HepB 11/19/1997, 09/16/1998, 11/24/1998     Hepatitis A (ADULT 19+) 04/22/2010, 08/24/2015     Hepatitis B (Peds <19Y) 11/19/1997     Hepatitis B, Adult 11/19/1997, 09/16/1998, 11/24/1998     Influenza (IIV3) PF 01/18/2001, 12/28/2001, 11/29/2002, 12/26/2003, 12/04/2006, 11/20/2007, 10/07/2010     Influenza Vaccine >6 months (Alfuria,Fluzone) 03/11/2019, 12/06/2019     MMR 11/19/1997, 09/16/1998     Meningococcal ACWY (Menactra ) 08/24/2015     OPV, monovalent, unspecified 11/19/1997, 09/16/1998, 10/19/1998, 08/04/2000     Poliovirus, inactivated (IPV) 11/19/1997, 09/16/1998, 10/19/1998     TDAP (Adacel,Boostrix) 04/22/2010     TDAP Vaccine (Adacel) 03/11/2019     Typhoid IM 04/22/2010     Varicella 08/04/2000, 08/24/2015     Yellow Fever 04/22/2010       No results found for: PAP    Recent Labs   Lab Test 04/28/22  0928 12/10/19  0000   A1C 5.2 5.1   LDL  --  84   HDL  --  49   TRIG  --  82   ALT  --  17   CR  --  0.71   GFRESTIMATED  --  >60   GFRESTBLACK  --  >60   POTASSIUM  --  3.8   TSH 2.16 4.440*           10/13/2022    11:36 AM 4/28/2022     8:52 AM   PHQ-2 ( 1999 Pfizer)   Q1: Little interest or pleasure in doing things 1 0   Q2: Feeling down, depressed or hopeless 1 0   PHQ-2 Score 2 0   Q1: Little interest or pleasure in doing things Several days    Q2: Feeling down, depressed or hopeless Several days    PHQ-2 Score 2            4/18/2019     2:51 PM 5/17/2019     2:23 PM 6/14/2023     1:42 PM   PHQ-9 SCORE   PHQ-9 Total Score 3 4 12           6/24/2022     9:22 AM 10/13/2022    11:48 AM 6/14/2023     1:42 PM   TOMMY-7 SCORE   Total Score 0 (minimal anxiety) 7 (mild anxiety)    Total Score 0 7 12            No data to display                Zeus Rene    July 19, 2023 10:41 AM

## 2023-07-19 NOTE — PROGRESS NOTES
"Today's Date: Jul 19, 2023     Patient Diamante Thompson 1995 presents to the clinic today to address   Chief Complaint   Patient presents with     Weight Loss             SUBJECTIVE     History of Present Illness:    27-year-old female with past medical history anxiety and obesity presents for 1 month follow-up.    Anxiety-patient was started on sertraline and hydroxyzine as needed during her last encounter.  She reports that she took the sertraline for approximately 2 weeks but discontinued it as she \"did not like the way it made me feel.\"  She moved back in with her parents last month with her child.  She reports that this has helped her anxiety and depressive symptoms immensely as she now has a strong support network.  She will sometimes use a CBD gummy as needed to help with anxiety at night, otherwise, she feels that she is in a much better place today.  Her PHQ-9 is a 7 and her TOMMY-7 is a 4 today which is much improved from last month where her PHQ-9 was a 12 and her TOMMY-7 was a 12.    Obesity-patient is up 5 pounds over the past month.  She reports today that she has been struggling with portion control.  She will eat a meal with her family and then about 20 to 30 minutes later she will still be hungry and continue eating.  She feels like she is never really full.  She does not take the CBD Gummies on a daily basis, but she believes it is possible that they could be causing increased hunger.  She denies eating in secret or alone at night after everyone has gone to sleep.  She denies feeling guilty about overeating.  She has not really been exercising recently.  She is still interested in a GLP-1 injectable as she believes that would really help with her portion control.  She denies history of multiple endocrine neoplasia syndrome type II.  She denies personal history of medullary thyroid carcinoma, however, she is unsure of family history as she knows that her sister has a thyroid issue which may require " surgery.  No other acute concerns or symptoms at time of exam.              Review of Systems   Constitutional, HEENT, cardiovascular, pulmonary, gi and gu systems are negative, except as otherwise noted.        No Known Allergies     Current Outpatient Medications   Medication Instructions     acetaminophen (TYLENOL) 500 mg     hydrOXYzine (ATARAX) 25 mg, Oral, 3 TIMES DAILY PRN     sertraline (ZOLOFT) 25 mg, Oral, DAILY, X 2 weeks, then increase to 1 tablet (50mg) po every day       History reviewed. No pertinent past medical history.     Family History   Problem Relation Age of Onset     Diabetes Mother         not on insulin     Diabetes Father         on insulin     Myocardial Infarction Father      Hypertension Father         Social History     Tobacco Use     Smoking status: Never     Smokeless tobacco: Never     Tobacco comments:     patient smoke hookah   Vaping Use     Vaping Use: Never used   Substance Use Topics     Alcohol use: No     Alcohol/week: 0.0 standard drinks of alcohol     Drug use: Yes     Types: Marijuana     Comment: Used until first OB visit        History   Sexual Activity     Sexual activity: Not Currently            4/18/2019     2:51 PM 5/17/2019     2:23 PM 6/14/2023     1:42 PM   PHQ   PHQ-9 Total Score 3 4 12   Q9: Thoughts of better off dead/self-harm past 2 weeks Not at all Not at all Not at all        Immunization History   Administered Date(s) Administered     COVID-19 Monovalent 18+ (Moderna) 06/24/2022     DTAP (<7y) 11/19/1997, 06/16/1998, 10/19/1998, 04/19/1999, 08/04/2000     Dtap, 5 Pertussis Antigens (DAPTACEL) 09/16/1998     HEPA 04/22/2010, 08/24/2015     HIB (PRP-T) 11/19/1997, 09/16/1998     HIB(PRP-OMP)(PedvaxHIB) 11/19/1997, 09/16/1998     HepB 11/19/1997, 09/16/1998, 11/24/1998     Hepatitis A (ADULT 19+) 04/22/2010, 08/24/2015     Hepatitis B (Peds <19Y) 11/19/1997     Hepatitis B, Adult 11/19/1997, 09/16/1998, 11/24/1998     Influenza (IIV3) PF 01/18/2001,  "12/28/2001, 11/29/2002, 12/26/2003, 12/04/2006, 11/20/2007, 10/07/2010     Influenza Vaccine >6 months (Alfuria,Fluzone) 03/11/2019, 12/06/2019     MMR 11/19/1997, 09/16/1998     Meningococcal ACWY (Menactra ) 08/24/2015     OPV, monovalent, unspecified 11/19/1997, 09/16/1998, 10/19/1998, 08/04/2000     Poliovirus, inactivated (IPV) 11/19/1997, 09/16/1998, 10/19/1998     TDAP (Adacel,Boostrix) 04/22/2010     TDAP Vaccine (Adacel) 03/11/2019     Typhoid IM 04/22/2010     Varicella 08/04/2000, 08/24/2015     Yellow Fever 04/22/2010                 OBJECTIVE     /70   Pulse 71   Temp 97.6  F (36.4  C) (Oral)   Ht 1.593 m (5' 2.7\")   Wt 80.7 kg (178 lb)   SpO2 98%   BMI 31.83 kg/m       Labs:  Lab Results   Component Value Date    WBC 14.8 (H) 05/29/2019    HGB 11.5 (L) 05/30/2019    HCT 39.2 05/29/2019     05/29/2019    CHOL 149 12/10/2019    TRIG 82 12/10/2019    HDL 49 12/10/2019    ALT 17 12/10/2019    AST 17 12/10/2019    TSH 2.16 04/28/2022        Physical Exam  Constitutional:       General: She is not in acute distress.     Appearance: She is not ill-appearing.   Eyes:      Extraocular Movements: Extraocular movements intact.   Cardiovascular:      Rate and Rhythm: Normal rate.   Pulmonary:      Effort: Pulmonary effort is normal. No respiratory distress.   Musculoskeletal:      Cervical back: Neck supple.   Neurological:      General: No focal deficit present.      Mental Status: She is alert.   Psychiatric:         Thought Content: Thought content normal.         Judgment: Judgment normal.               ASSESSMENT/PLAN       1. Class 1 obesity without serious comorbidity with body mass index (BMI) of 32.0 to 32.9 in adult, unspecified obesity type  Patient is up 5 pounds over the past month.  At this point, considering her portion control concerns, the GLP-1 seems reasonable.  There is also possibility that she has a form of binge eating disorder.  She was instructed to follow-up with her " psychiatry team to discuss CBT.  She denies a personal/family history of multiple endocrine neoplasia syndrome type II.  She is unsure about her family history of medullary thyroid cancer, she was instructed to reach out to her sister to confirm what her thyroid diagnosis is.  If she does not have a family history of medullary thyroid cancer, then we will prescribe Wegovy.  Advised on common side effects including nausea.  Advised to monitor for symptoms of pancreatitis including sharp abdominal pain.  Patient was advised to follow-up 8 weeks after starting GLP-1 for symptom/weight check and for lab monitoring (patient denies history of CKD or pancreatitis).    2. Anxiety and depression  Symptoms improved with patient moving in with her parents and having a strong support network.  She is using CBD Gummies as needed for anxiety.  Advised patient to be wary about dosing consistency with products that are not FDA regulated.  As noted above, advised patient to follow-up with psychiatry to discuss possible binge eating habits.      Follow-Up:  - Follow up in 8 weeks, or if symptoms worsen or fail to improve.     Options for treatment and follow-up care were reviewed with the patient. Patient engaged in the decision making process and verbalized understanding of the options discussed and agreed with the final plan.  AVS printed and given to patient.    SARA Wen [facility]  [facility]  [address] [address] [phone]        Note: Chart documentation was done in part with Dragon Voice Recognition software.  Although reviewed after completion, some word and grammatical errors may remain. Please contact author for any clarification or concerns.

## 2023-07-19 NOTE — PATIENT INSTRUCTIONS
Please check with your family for history of medullary thyroid carcinoma (MTC) or multiple endocrine neoplasia syndrome type 2 (MEN 2).

## 2023-07-20 ENCOUNTER — TELEPHONE (OUTPATIENT)
Dept: FAMILY MEDICINE | Facility: CLINIC | Age: 28
End: 2023-07-20
Payer: COMMERCIAL

## 2023-07-20 DIAGNOSIS — E66.811 CLASS 1 OBESITY WITHOUT SERIOUS COMORBIDITY WITH BODY MASS INDEX (BMI) OF 32.0 TO 32.9 IN ADULT, UNSPECIFIED OBESITY TYPE: Primary | ICD-10-CM

## 2023-07-20 NOTE — TELEPHONE ENCOUNTER
Patient called to let provider know that the family history  question if anyone in her family has a history of medullary thyroid carcinoma or endocrine neoplasia was both no.

## 2023-08-08 ENCOUNTER — TELEPHONE (OUTPATIENT)
Dept: FAMILY MEDICINE | Facility: CLINIC | Age: 28
End: 2023-08-08
Payer: COMMERCIAL

## 2023-08-08 NOTE — TELEPHONE ENCOUNTER
[facility] Prior Authorization Team Request    Medication:  Wegovy 0.25mg/0.5ml  Dosing:Inject 0.25 mg under the skin once a week   NDC (required for Medicaid members): [address]-64  Insurance Company: [facility]  BIN: [id number]  PCN: [id number]  Grp: [id number]  ID: [id number]  CoverMyMeds Key (if applicable):   Additional documentation:   Pharmacy Filling the Rx:  [facility]  Filling Pharmacy Phone:  [phone]  Contact: [facility] [employer] [employer] [facility] ([id number]) please send all responses to this pool.  Pharmacy NPI (required for Medicaid members):[id number]

## 2023-08-11 NOTE — TELEPHONE ENCOUNTER
Central Prior Authorization Team   Phone: [phone]    PA Initiation    Medication: WEGOVY 0.25 MG/0.5ML SC SOAJ  Insurance Company: [employer] - Phone [phone] Fax [phone]  Pharmacy Filling the Rx: [facility] [place], [place] - [address] [place] [address] [id number]  Filling Pharmacy Phone: [phone]  Filling Pharmacy Fax:    Start Date: 8/11/2023

## 2023-08-15 NOTE — TELEPHONE ENCOUNTER
Prior Authorization Approval    Medication: WEGOVY 0.25 MG/0.5ML SC SOAJ  Authorization Effective Date: 7/12/2023  Authorization Expiration Date: 8/10/2024  Approved Dose/Quantity:   Reference #: [id number]   Insurance Company: [employer] - Phone [phone] Fax [phone]  Expected CoPay:       CoPay Card Available:      Financial Assistance Needed:   Which Pharmacy is filling the prescription: [facility] [place], [place] - [address] [place] [address] [id number]  Pharmacy Notified: Yes  Patient Notified: No

## 2023-09-16 ENCOUNTER — HEALTH MAINTENANCE LETTER (OUTPATIENT)
Age: 28
End: 2023-09-16

## 2024-11-09 ENCOUNTER — HEALTH MAINTENANCE LETTER (OUTPATIENT)
Age: 29
End: 2024-11-09

## 2025-01-17 ENCOUNTER — MYC REFILL (OUTPATIENT)
Dept: FAMILY MEDICINE | Facility: CLINIC | Age: 30
End: 2025-01-17

## 2025-01-17 DIAGNOSIS — E66.811 CLASS 1 OBESITY WITHOUT SERIOUS COMORBIDITY WITH BODY MASS INDEX (BMI) OF 32.0 TO 32.9 IN ADULT, UNSPECIFIED OBESITY TYPE: ICD-10-CM

## 2025-01-17 PROBLEM — Z36.89 ENCOUNTER FOR TRIAGE IN PREGNANT PATIENT: Status: RESOLVED | Noted: 2018-12-22 | Resolved: 2025-01-17

## 2025-02-25 ENCOUNTER — MYC REFILL (OUTPATIENT)
Dept: FAMILY MEDICINE | Facility: CLINIC | Age: 30
End: 2025-02-25

## 2025-02-25 DIAGNOSIS — E66.811 CLASS 1 OBESITY WITHOUT SERIOUS COMORBIDITY WITH BODY MASS INDEX (BMI) OF 32.0 TO 32.9 IN ADULT, UNSPECIFIED OBESITY TYPE: ICD-10-CM
